# Patient Record
Sex: MALE | Race: ASIAN | NOT HISPANIC OR LATINO | Employment: FULL TIME | URBAN - METROPOLITAN AREA
[De-identification: names, ages, dates, MRNs, and addresses within clinical notes are randomized per-mention and may not be internally consistent; named-entity substitution may affect disease eponyms.]

---

## 2021-04-19 ENCOUNTER — OFFICE VISIT (OUTPATIENT)
Dept: FAMILY MEDICINE CLINIC | Facility: CLINIC | Age: 56
End: 2021-04-19
Payer: COMMERCIAL

## 2021-04-19 VITALS
SYSTOLIC BLOOD PRESSURE: 122 MMHG | DIASTOLIC BLOOD PRESSURE: 76 MMHG | HEART RATE: 65 BPM | BODY MASS INDEX: 34.96 KG/M2 | HEIGHT: 62 IN | TEMPERATURE: 98 F | WEIGHT: 190 LBS | RESPIRATION RATE: 18 BRPM | OXYGEN SATURATION: 99 %

## 2021-04-19 DIAGNOSIS — Z13.6 SCREENING FOR CARDIOVASCULAR CONDITION: ICD-10-CM

## 2021-04-19 DIAGNOSIS — M17.0 OSTEOARTHRITIS OF BOTH KNEES, UNSPECIFIED OSTEOARTHRITIS TYPE: ICD-10-CM

## 2021-04-19 DIAGNOSIS — Z12.5 PROSTATE CANCER SCREENING: ICD-10-CM

## 2021-04-19 DIAGNOSIS — D50.9 IRON DEFICIENCY ANEMIA, UNSPECIFIED IRON DEFICIENCY ANEMIA TYPE: ICD-10-CM

## 2021-04-19 DIAGNOSIS — Z00.00 WELL ADULT EXAM: Primary | ICD-10-CM

## 2021-04-19 PROBLEM — M17.10 OSTEOARTHRITIS OF KNEE: Status: ACTIVE | Noted: 2018-04-11

## 2021-04-19 PROBLEM — M17.11 PRIMARY OSTEOARTHRITIS OF RIGHT KNEE: Status: ACTIVE | Noted: 2018-04-11

## 2021-04-19 PROBLEM — E78.5 DYSLIPIDEMIA, GOAL LDL BELOW 130: Status: ACTIVE | Noted: 2019-04-26

## 2021-04-19 PROBLEM — M17.9 OSTEOARTHRITIS OF KNEE: Status: ACTIVE | Noted: 2018-04-11

## 2021-04-19 PROBLEM — L94.0: Status: ACTIVE | Noted: 2018-04-11

## 2021-04-19 PROCEDURE — 3725F SCREEN DEPRESSION PERFORMED: CPT | Performed by: INTERNAL MEDICINE

## 2021-04-19 PROCEDURE — 1036F TOBACCO NON-USER: CPT | Performed by: INTERNAL MEDICINE

## 2021-04-19 PROCEDURE — 99386 PREV VISIT NEW AGE 40-64: CPT | Performed by: INTERNAL MEDICINE

## 2021-04-19 PROCEDURE — 3008F BODY MASS INDEX DOCD: CPT | Performed by: INTERNAL MEDICINE

## 2021-04-19 RX ORDER — DICLOFENAC SODIUM 75 MG/1
75 TABLET, DELAYED RELEASE ORAL 2 TIMES DAILY PRN
COMMUNITY
Start: 2021-03-01 | End: 2021-05-08 | Stop reason: SDUPTHER

## 2021-04-19 NOTE — PROGRESS NOTES
FAMILY PRACTICE HEALTH MAINTENANCE OFFICE VISIT  [facility]    NAME: Faustino Gallagher  AGE: 50 y o  SEX: male  : 1965     DATE: 2021    Assessment and Plan     There are no diagnoses linked to this encounter  · Patient Counseling:   · Nutrition: Stressed importance of a well balanced diet, moderation of sodium/saturated fat, caloric balance and sufficient intake of fiber  · Exercise: Stressed the importance of regular exercise with a goal of 150 minutes per week  · Dental Health: Discussed daily flossing and brushing and regular dental visits     · Immunizations reviewed: Up To Date  · Discussed benefits of:  Colon Cancer Screening, Prostate Cancer Screening  and Screening labs   BMI Counseling: There is no height or weight on file to calculate BMI  Discussed with patient's BMI with him  The BMI is above normal  Nutrition recommendations include reducing portion sizes and decreasing overall calorie intake  Exercise recommendations include moderate aerobic physical activity for 150 minutes/week  No follow-ups on file  Chief Complaint     Chief Complaint   Patient presents with    Establish Care     Needs PCP  buck    Physical Exam       History of Present Illness     NP, here for CPE  Has history of iron deficiency anemia  Had colonoscopy , this was normal   Takes replacement as needed  No labs in a couple of years  Has history of bilateral knee DJD and has not seen ortho but does not want to  Has previously followed up with rheumatology and feels more comfortable doing this    Would like a referral          Well Adult Physical   Patient here for a comprehensive physical exam       Diet and Physical Activity  Diet: well balanced diet  Exercise: daily      Depression Screen  PHQ-9 Depression Screening    PHQ-9:   Frequency of the following problems over the past two weeks:      Little interest or pleasure in doing things: 0 - not at all  Feeling down, depressed, or hopeless: 0 - not at all  PHQ-2 Score: 0          General Health  Hearing: Normal:  bilateral  Vision: no vision problems  Dental: regular dental visits    Reproductive Health  No issues  and Denies nocturia      The following portions of the patient's history were reviewed and updated as appropriate: allergies, current medications, past family history, past medical history, past social history, past surgical history and problem list     Review of Systems     Review of Systems   Constitutional: Negative  HENT: Negative  Eyes: Negative  Respiratory: Negative  Cardiovascular: Negative  Gastrointestinal: Negative  Endocrine: Negative  Genitourinary: Negative  Musculoskeletal: Positive for arthralgias  Skin: Negative  Allergic/Immunologic: Negative  Neurological: Negative  Hematological: Negative  Psychiatric/Behavioral: Negative  Past Medical History     No past medical history on file  Past Surgical History     No past surgical history on file      Social History     Social History     Socioeconomic History    Marital status: /Civil Union     Spouse name: Not on file    Number of children: Not on file    Years of education: Not on file    Highest education level: Not on file   Occupational History    Not on file   Social Needs    Financial resource strain: Not on file    Food insecurity     Worry: Not on file     Inability: Not on file   [employer] needs     Medical: Not on file     Non-medical: Not on file   Tobacco Use    Smoking status: Not on file   Substance and Sexual Activity    Alcohol use: Not on file    Drug use: Not on file    Sexual activity: Not on file   Lifestyle    Physical activity     Days per week: Not on file     Minutes per session: Not on file    Stress: Not on file   Relationships    Social connections     Talks on phone: Not on file     Gets together: Not on file     Attends Muslim service: Not on file     Active member of club or organization: Not on file     Attends meetings of clubs or organizations: Not on file     Relationship status: Not on file    Intimate partner violence     Fear of current or ex partner: Not on file     Emotionally abused: Not on file     Physically abused: Not on file     Forced sexual activity: Not on file   Other Topics Concern    Not on file   Social History Narrative    Not on file       Family History     No family history on file  Current Medications       Current Outpatient Medications:     diclofenac (VOLTAREN) 75 mg EC tablet, Take 75 mg by mouth 2 (two) times a day as needed, Disp: , Rfl:      Allergies     No Known Allergies    Objective     There were no vitals taken for this visit  Physical Exam  Constitutional:       General: He is not in acute distress  Appearance: He is well-developed  HENT:      Head: Normocephalic and atraumatic  Right Ear: External ear normal       Left Ear: External ear normal       Nose: Nose normal    Eyes:      Conjunctiva/sclera: Conjunctivae normal       Pupils: Pupils are equal, round, and reactive to light  Neck:      Musculoskeletal: Normal range of motion and neck supple  Thyroid: No thyromegaly  Cardiovascular:      Rate and Rhythm: Normal rate and regular rhythm  Heart sounds: No murmur  No friction rub  No gallop  Pulmonary:      Effort: Pulmonary effort is normal       Breath sounds: Normal breath sounds  No wheezing or rales  Abdominal:      General: Bowel sounds are normal  There is no distension  Palpations: Abdomen is soft  Tenderness: There is no abdominal tenderness  Genitourinary:     Comments: Patient defers exam    Musculoskeletal: Normal range of motion  General: No tenderness or deformity  Lymphadenopathy:      Cervical: No cervical adenopathy  Skin:     General: Skin is dry  Findings: No rash     Neurological:      Mental Status: He is alert and oriented to person, place, and time  Cranial Nerves: No cranial nerve deficit  Motor: No abnormal muscle tone  Coordination: Coordination normal       Deep Tendon Reflexes: Reflexes are normal and symmetric  Reflexes normal    Psychiatric:         Behavior: Behavior normal          Thought Content:  Thought content normal          Judgment: Judgment normal             Visual Acuity Screening    Right eye Left eye Both eyes   Without correction: 20/25 20/25 20/25   With correction:              MD DOMINIQUE Penny [facility]

## 2021-04-20 ENCOUNTER — TELEPHONE (OUTPATIENT)
Dept: ADMINISTRATIVE | Facility: OTHER | Age: 56
End: 2021-04-20

## 2021-04-23 ENCOUNTER — IMMUNIZATIONS (OUTPATIENT)
Dept: FAMILY MEDICINE CLINIC | Facility: HOSPITAL | Age: 56
End: 2021-04-23

## 2021-04-23 DIAGNOSIS — Z23 ENCOUNTER FOR IMMUNIZATION: Primary | ICD-10-CM

## 2021-04-23 PROCEDURE — 0011A SARS-COV-2 / COVID-19 MRNA VACCINE (MODERNA) 100 MCG: CPT

## 2021-04-23 PROCEDURE — 91301 SARS-COV-2 / COVID-19 MRNA VACCINE (MODERNA) 100 MCG: CPT

## 2021-05-08 DIAGNOSIS — M17.0 OSTEOARTHRITIS OF BOTH KNEES, UNSPECIFIED OSTEOARTHRITIS TYPE: Primary | ICD-10-CM

## 2021-05-08 NOTE — TELEPHONE ENCOUNTER
----- Message from Yvrose Ortega sent at 5/8/2021  9:18 AM EDT -----  Regarding: Prescription Question  Contact: [phone]  Dear Dr Divina Mendosa,  I need prescription refill of diclofenac 75 mg    Thank you  Hoa Dejesus

## 2021-05-10 RX ORDER — DICLOFENAC SODIUM 75 MG/1
75 TABLET, DELAYED RELEASE ORAL 2 TIMES DAILY PRN
Qty: 60 TABLET | Refills: 1 | Status: SHIPPED | OUTPATIENT
Start: 2021-05-10 | End: 2021-06-12 | Stop reason: SDUPTHER

## 2021-05-25 ENCOUNTER — IMMUNIZATIONS (OUTPATIENT)
Dept: FAMILY MEDICINE CLINIC | Facility: HOSPITAL | Age: 56
End: 2021-05-25

## 2021-05-25 DIAGNOSIS — Z23 ENCOUNTER FOR IMMUNIZATION: Primary | ICD-10-CM

## 2021-05-25 PROCEDURE — 91301 SARS-COV-2 / COVID-19 MRNA VACCINE (MODERNA) 100 MCG: CPT

## 2021-05-25 PROCEDURE — 0012A SARS-COV-2 / COVID-19 MRNA VACCINE (MODERNA) 100 MCG: CPT

## 2021-06-06 LAB
ALBUMIN SERPL-MCNC: 4.2 G/DL (ref 3.8–4.9)
ALBUMIN/GLOB SERPL: 1.8 {RATIO} (ref 1.2–2.2)
ALP SERPL-CCNC: 113 IU/L (ref 48–121)
ALT SERPL-CCNC: 8 IU/L (ref 0–44)
AST SERPL-CCNC: 15 IU/L (ref 0–40)
BASOPHILS # BLD AUTO: 0.1 X10E3/UL (ref 0–0.2)
BASOPHILS NFR BLD AUTO: 1 %
BILIRUB SERPL-MCNC: 0.4 MG/DL (ref 0–1.2)
BUN SERPL-MCNC: 9 MG/DL (ref 6–24)
BUN/CREAT SERPL: 10 (ref 9–20)
CALCIUM SERPL-MCNC: 9.6 MG/DL (ref 8.7–10.2)
CHLORIDE SERPL-SCNC: 106 MMOL/L (ref 96–106)
CHOLEST SERPL-MCNC: 242 MG/DL (ref 100–199)
CHOLEST/HDLC SERPL: 6.9 RATIO (ref 0–5)
CO2 SERPL-SCNC: 23 MMOL/L (ref 20–29)
CREAT SERPL-MCNC: 0.93 MG/DL (ref 0.76–1.27)
EOSINOPHIL # BLD AUTO: 2 X10E3/UL (ref 0–0.4)
EOSINOPHIL NFR BLD AUTO: 24 %
ERYTHROCYTE [DISTWIDTH] IN BLOOD BY AUTOMATED COUNT: 12.8 % (ref 11.6–15.4)
GLOBULIN SER-MCNC: 2.4 G/DL (ref 1.5–4.5)
GLUCOSE SERPL-MCNC: 89 MG/DL (ref 65–99)
HCT VFR BLD AUTO: 41 % (ref 37.5–51)
HDLC SERPL-MCNC: 35 MG/DL
HGB BLD-MCNC: 13.5 G/DL (ref 13–17.7)
IMM GRANULOCYTES # BLD: 0 X10E3/UL (ref 0–0.1)
IMM GRANULOCYTES NFR BLD: 0 %
IRON SERPL-MCNC: 60 UG/DL (ref 38–169)
LDLC SERPL CALC-MCNC: 180 MG/DL (ref 0–99)
LYMPHOCYTES # BLD AUTO: 2.4 X10E3/UL (ref 0.7–3.1)
LYMPHOCYTES NFR BLD AUTO: 29 %
MCH RBC QN AUTO: 27.2 PG (ref 26.6–33)
MCHC RBC AUTO-ENTMCNC: 32.9 G/DL (ref 31.5–35.7)
MCV RBC AUTO: 83 FL (ref 79–97)
MICRODELETION SYND BLD/T FISH: NORMAL
MONOCYTES # BLD AUTO: 0.6 X10E3/UL (ref 0.1–0.9)
MONOCYTES NFR BLD AUTO: 7 %
MORPHOLOGY BLD-IMP: ABNORMAL
NEUTROPHILS # BLD AUTO: 3.4 X10E3/UL (ref 1.4–7)
NEUTROPHILS NFR BLD AUTO: 39 %
PLATELET # BLD AUTO: 348 X10E3/UL (ref 150–450)
POTASSIUM SERPL-SCNC: 4.4 MMOL/L (ref 3.5–5.2)
PROT SERPL-MCNC: 6.6 G/DL (ref 6–8.5)
PSA SERPL-MCNC: 0.3 NG/ML (ref 0–4)
RBC # BLD AUTO: 4.97 X10E6/UL (ref 4.14–5.8)
SL AMB EGFR AFRICAN AMERICAN: 108 ML/MIN/1.73
SL AMB EGFR NON AFRICAN AMERICAN: 93 ML/MIN/1.73
SL AMB VLDL CHOLESTEROL CALC: 27 MG/DL (ref 5–40)
SODIUM SERPL-SCNC: 141 MMOL/L (ref 134–144)
TRIGL SERPL-MCNC: 148 MG/DL (ref 0–149)
WBC # BLD AUTO: 8.4 X10E3/UL (ref 3.4–10.8)

## 2021-06-12 DIAGNOSIS — M17.0 OSTEOARTHRITIS OF BOTH KNEES, UNSPECIFIED OSTEOARTHRITIS TYPE: ICD-10-CM

## 2021-06-12 RX ORDER — DICLOFENAC SODIUM 75 MG/1
75 TABLET, DELAYED RELEASE ORAL 2 TIMES DAILY PRN
Qty: 60 TABLET | Refills: 0 | Status: SHIPPED | OUTPATIENT
Start: 2021-06-12 | End: 2021-07-04 | Stop reason: SDUPTHER

## 2021-07-04 DIAGNOSIS — M17.0 OSTEOARTHRITIS OF BOTH KNEES, UNSPECIFIED OSTEOARTHRITIS TYPE: ICD-10-CM

## 2021-07-06 RX ORDER — DICLOFENAC SODIUM 75 MG/1
75 TABLET, DELAYED RELEASE ORAL 2 TIMES DAILY PRN
Qty: 60 TABLET | Refills: 0 | Status: SHIPPED | OUTPATIENT
Start: 2021-07-06 | End: 2021-07-12

## 2021-07-10 ENCOUNTER — OFFICE VISIT (OUTPATIENT)
Dept: URGENT CARE | Facility: CLINIC | Age: 56
End: 2021-07-10
Payer: COMMERCIAL

## 2021-07-10 VITALS
HEART RATE: 81 BPM | HEIGHT: 63 IN | BODY MASS INDEX: 32.96 KG/M2 | DIASTOLIC BLOOD PRESSURE: 89 MMHG | SYSTOLIC BLOOD PRESSURE: 122 MMHG | TEMPERATURE: 98.8 F | OXYGEN SATURATION: 99 % | WEIGHT: 186 LBS | RESPIRATION RATE: 18 BRPM

## 2021-07-10 DIAGNOSIS — L25.9 CONTACT DERMATITIS, UNSPECIFIED CONTACT DERMATITIS TYPE, UNSPECIFIED TRIGGER: Primary | ICD-10-CM

## 2021-07-10 PROCEDURE — 3008F BODY MASS INDEX DOCD: CPT | Performed by: INTERNAL MEDICINE

## 2021-07-10 PROCEDURE — 99213 OFFICE O/P EST LOW 20 MIN: CPT | Performed by: PHYSICIAN ASSISTANT

## 2021-07-10 RX ORDER — METHYLPREDNISOLONE 4 MG/1
TABLET ORAL
Qty: 1 EACH | Refills: 0 | Status: SHIPPED | OUTPATIENT
Start: 2021-07-10 | End: 2021-09-03

## 2021-07-10 RX ORDER — TRIAMCINOLONE ACETONIDE 1 MG/G
CREAM TOPICAL 2 TIMES DAILY
Qty: 30 G | Refills: 0 | Status: SHIPPED | OUTPATIENT
Start: 2021-07-10

## 2021-07-10 NOTE — PATIENT INSTRUCTIONS
Acute Rash   WHAT YOU NEED TO KNOW:   A rash is irritated, red, or itchy skin or mucus membranes, such as the lining of your nose or throat  Acute means the rash starts suddenly, worsens quickly, and lasts a short time  Common causes include a disease or infection, a reaction to something you are allergic to, or certain medicines  DISCHARGE INSTRUCTIONS:   Return to the emergency department if:   · You have sudden trouble breathing or chest pain  · You are vomiting, have a headache or muscle aches, and your throat hurts  Call your doctor or dermatologist if:   · You have a fever  · You get open wounds from scratching your skin, or you have a wound that is red, swollen, or painful  · Your rash lasts longer than 3 months  · You have swelling or pain in your joints  · You have questions or concerns about your condition or care  Medicines:  If your rash does not go away on its own, you may need the following medicines:  · Antihistamines  may be given to help decrease itching  · Steroids  may be given to decrease inflammation  · Antibiotics  help fight or prevent a bacterial infection  · Take your medicine as directed  Contact your healthcare provider if you think your medicine is not helping or if you have side effects  Tell him of her if you are allergic to any medicine  Keep a list of the medicines, vitamins, and herbs you take  Include the amounts, and when and why you take them  Bring the list or the pill bottles to follow-up visits  Carry your medicine list with you in case of an emergency  Prevent a rash or care for your skin when you have a rash:  Dry skin can lead to more problems  Do not scratch your skin if it itches  You may cause a skin infection by scratching  The following may prevent dry skin, and help your skin look better:  · Help soothe your rash  Apply thick cream lotions or petroleum jelly  Cool compresses may also soothe your skin   Apply a cool compress or a cool, wet towel, and then cover it with a dry towel  · Use lukewarm water when you bathe  Hot water may damage your skin more  Pat your skin dry  Do not rub your skin with a towel  · Use detergents, soaps, shampoos, and bubble baths  made for sensitive skin  · Wear clothes made of cotton instead of nylon or wool  Cotton is softer, so it will not hurt your skin as much  Follow up with your healthcare providers as directed:  A dermatologist may help find the cause of your rash or help plan or change treatment  A dietitian may help with meal planning if you have a food allergy  Write down your questions so you remember to ask them during your visits  © Copyright [address] Information is for End User's use only and may not be sold, redistributed or otherwise used for commercial purposes  All illustrations and images included in CareNotes® are the copyrighted property of A D A M , Inc  or [facility]  The above information is an  only  It is not intended as medical advice for individual conditions or treatments  Talk to your doctor, nurse or pharmacist before following any medical regimen to see if it is safe and effective for you  Dermatitis:  -The rash does not appear infective  No sign of dermatophyte /yeast infection  No sign of lyme  No sign of poison ivy  We discussed that the rash appears to be a contact dermatitis     -Will prescribe Medrol dose aida to be started today and taken as directed  Take with meals    -Will also prescribe Triamcinolone cream to be applied as directed  -Stay well hydrated  You can take benadryl as directed for the itching    -topical benadryl can be applied for relief   You may also use calamine lotion or take oatmeal baths    -If your symptoms worsen or persist follow up immediately with your PCP or Dermatologist

## 2021-07-10 NOTE — PROGRESS NOTES
Assessment and Plan:   Mr Felicity Fagan is a 71-year-old male with history significant for bilateral knee osteoarthritis (presumed to be primary in nature), who presents for further evaluation of this  He is referred by Dr Kate Evans for a rheumatology consult  Daliacallie Xiao presents today for further evaluation of bilateral knee arthritic complaints he has been experiencing over the past 15 years, with symptoms potentially concerning for an inflammatory arthritis as he has noticed recurrent episodes of swelling and describes prolonged morning stiffness  There are also concerns for right knee synovitis noted on his examination today which I suspect is masked to some degree as he is on a current course of steroids that was prescribed for a skin rash  He reports that the steroids have significantly helped with the bilateral knee pain and swelling which may also guide towards an inflammatory arthritis  Based on his presentation I suspect that there may be a combination of both an inflammatory arthropathy and primary osteoarthritis ongoing     - To further evaluate his symptoms we will complete the workup as listed below and also obtain bilateral knee x-rays  Depending on the findings I may consider obtaining an MRI of his right knee which may aid in diagnosing an inflammatory arthritis  A repeat arthrocentesis may also be warranted but I would like to hold off on this for today as he has an overlying skin rash on his knees of unclear etiology  Intra-articular cortisone injections may also be beneficial but we will discuss this at future office visits  - In the meanwhile he will complete the steroid course and I will discontinue his diclofenac as this has been ineffective  I will trial him on meloxicam 15 mg once a day as needed until the follow up visit      - For further evaluation of the fevers he has been experiencing over the past 2-3 months I requested he contact his primary care physician for a fever work up       Plan:  Diagnoses and all orders for this visit:    Osteoarthritis of both knees, unspecified osteoarthritis type  -     ZULEMA Screen w/ Reflex to Titer/Pattern; Future  -     Sjogren's Antibodies; Future  -     RF Screen w/ Reflex to Titer; Future  -     Cyclic citrul peptide antibody, IgG; Future  -     C-reactive protein; Future  -     Sedimentation rate, automated; Future  -     HLA-B27 antigen; Future  -     Chronic Hepatitis Panel; Future  -     CK; Future  -     Ferritin; Future  -     Uric acid; Future  -     XR knee 3 vw left non injury; Future  -     XR knee 3 vw right non injury; Future  -     Ambulatory referral to Rheumatology  -     meloxicam (MOBIC) 15 mg tablet; Take 1 tablet (15 mg total) by mouth daily as needed for moderate pain    Fever, unspecified fever cause      Activities as tolerated  Exercise: try to maintain a low impact exercise regimen as much as possible  Continue other medications as prescribed by PCP and other specialists  RTC in 4 weeks  HPI  Mr South Lyles is a 61-year-old male with history significant for bilateral knee osteoarthritis (presumed to be primary in nature), who presents for further evaluation of this  He is referred by Dr Navarro Fuentes for a rheumatology consult  Patient reports over the past 15 years he has experienced recurrent pain and swelling affecting his bilateral knees, primarily on the right side  He was seen by Rheumatology in [facility] for these symptoms a few years ago and had a thorough rheumatological workup done which was unrevealing  His presentation was thought to be consistent with primary osteoarthritis  He has received intra-articular cortisone injections 1-2 times and this has helped him significantly  No recent injections  He has also had multiple arthrocentesis performed which apparently have been unrevealing    His most recent one was done in April 2019 and showed a synovial fluid WBC count of 2464 with a negative synovial fluid crystal and Lyme PCR analysis  There was a note mentioning extensive inflammation and reactive changes were present  He has not had knee x-rays done in a few years but apparently this has showed tricompartmental narrowing with joint effusions  He has not had an MRI of his knees  He reports over the past 15 years his symptoms have progressively worsened where he experiences some degree of pain and swelling on a daily and constant basis, but states that the swelling can flare-up  He reports over the past 15 years he has also had intermittent pain that will affect his bilateral hands (in the PIP and MCP joints), wrists, ankles and feet  These episodes are usually short-lived and infrequent  He denies any joint pains in his elbows, shoulders, low back or hips  Other than the knee swelling he denies other joint swelling  He experiences morning stiffness which affects his knees and can take 2-3 hours to improve  He has tried naproxen in the past which has provided him with mild relief which is short-lived  He has been on diclofenac 75 mg twice a day as needed and states that this does not really help  He has not been prescribed repeat courses of steroids in the past   He is currently on a methylprednisolone Dosepak that was started on July 10th after he was seen in the urgent care for a skin rash that he has been experiencing on his knees for the past 5 days  He reports with his current steroid use this has significantly helped with the bilateral knee pain and swelling and he feels like the skin rash is also clearing up  He reports that the skin rash has occurred for the first time and is not a recurrent issue  He mentions over the past 2-3 months he has been experiencing fevers up to 102° F which will last 24 hours and occurs every 7-10 days  He has not seen his primary care physician for this    He denies night sweats, unintentional weight loss, inflammatory eye disease (he does experience chronic dry eyes and occasional dry mouth), other types of skin rash, psoriasis, mouth/nose ulcers (can occasionally experience a canker sore related to increased stress), swollen glands, pleuritic chest pain, inflammatory bowel disease, blood clots or a family history of autoimmune disease  He denies any other medical conditions or symptoms and reports that he is otherwise healthy  The following portions of the patient's history were reviewed and updated as appropriate: allergies, current medications, past family history, past medical history, past social history, past surgical history and problem list       Review of Systems  Constitutional: Negative for weight change, chills, night sweats, fatigue  Positive for fever  ENT/Mouth: Negative for hearing changes, ear pain, nasal congestion, sinus pain, hoarseness, sore throat, rhinorrhea, swallowing difficulty  Eyes: Negative for pain, redness, discharge, vision changes  Cardiovascular: Negative for chest pain, SOB, palpitations  Respiratory: Negative for cough, sputum, wheezing, dyspnea  Gastrointestinal: Negative for nausea, vomiting, diarrhea, constipation, pain, heartburn  Genitourinary: Negative for dysuria, urinary frequency, hematuria  Musculoskeletal: As per HPI  Skin: Negative for color changes  Positive for skin rash  Neuro: Negative for weakness, numbness, tingling, loss of consciousness  Psych: Negative for anxiety, depression  Heme/Lymph: Negative for easy bruising, bleeding, lymphadenopathy  History reviewed  No pertinent past medical history        Past Surgical History:   Procedure Laterality Date    APPENDECTOMY      DENTAL SURGERY         Social History     Socioeconomic History    Marital status: /Civil Union     Spouse name: Not on file    Number of children: Not on file    Years of education: Not on file    Highest education level: Not on file   Occupational History    Not on file   Tobacco Use    Smoking status: Never Smoker    Smokeless tobacco: Never Used   Vaping Use    Vaping Use: Never used   Substance and Sexual Activity    Alcohol use: Never    Drug use: Never    Sexual activity: Not on file   Other Topics Concern    Not on file   Social History Narrative    Not on file     Social Determinants of Health     Financial Resource Strain:     Difficulty of Paying Living Expenses:    Food Insecurity:     Worried About Running Out of Food in the Last Year:     [address] in the Last Year:    Transportation Needs:     Lack of Transportation (Medical):  Lack of Transportation (Non-Medical):    Physical Activity:     Days of Exercise per Week:     Minutes of Exercise per Session:    Stress:     Feeling of Stress :    Social Connections:     Frequency of Communication with Friends and Family:     Frequency of Social Gatherings with Friends and Family:     Attends [employer] Services:     Active Member of Clubs or Organizations:     Attends Club or Organization Meetings:     Marital Status:    Intimate Partner Violence:     Fear of Current or Ex-Partner:     Emotionally Abused:     Physically Abused:     Sexually Abused:        History reviewed  No pertinent family history  No Known Allergies      Current Outpatient Medications:     meloxicam (MOBIC) 15 mg tablet, Take 1 tablet (15 mg total) by mouth daily as needed for moderate pain, Disp: 30 tablet, Rfl: 0    methylPREDNISolone 4 MG tablet therapy pack, Use as directed on package, Disp: 1 each, Rfl: 0    triamcinolone (KENALOG) 0 1 % cream, Apply topically 2 (two) times a day, Disp: 30 g, Rfl: 0      Objective:    Vitals:    07/12/21 0856   BP: 128/78   Pulse: 71   Temp: 98 6 °F (37 °C)   Weight: 85 1 kg (187 lb 9 6 oz)       Physical Exam  General: Well appearing, well nourished, in no distress  Oriented x 3, normal mood and affect  Ambulating without difficulty    Skin: Good turgor, no unusual bruising or prominent lesions  There is a macular pinpoint, erythematous rash scattered over his bilateral knees  Hair: Male pattern baldness  Nails: Normal color, no deformities  HEENT:  Head: Normocephalic, atraumatic  Eyes: Conjunctiva clear, sclera non-icteric, EOM intact  Extremities: No amputations or deformities, cyanosis, edema  Musculoskeletal:   Hands, wrists, elbows and shoulders - unremarkable  Ankles and feet - unremarkable  Knees - there is mild synovitis noted at his right knee without tenderness or restriction in range of motion  There is no warmth or erythema noted (except for related to the skin rash)  The left knee appears to be unremarkable without any significant soft tissue swelling or tenderness  Neurologic: Alert and oriented  No focal neurological deficits appreciated  Psychiatric: Normal mood and affect  MAXWELL Cox    Rheumatology

## 2021-07-10 NOTE — PROGRESS NOTES
[facility] [employer] [facility]        NAME: Davide Ferreira is a 50 y o  male  : 1965    MRN: [id number]  DATE: July 10, 2021  TIME: 8:43 AM    Assessment and Plan   Contact dermatitis, unspecified contact dermatitis type, unspecified trigger [L25 9]  1  Contact dermatitis, unspecified contact dermatitis type, unspecified trigger  methylPREDNISolone 4 MG tablet therapy pack    triamcinolone (KENALOG) 0 1 % cream         Patient Instructions   Dermatitis:  -The rash does not appear infective  No sign of dermatophyte /yeast infection  No sign of lyme  No sign of poison ivy  We discussed that the rash appears to be a contact dermatitis     -Will prescribe Medrol dose aida to be started today and taken as directed  Take with meals    -Will also prescribe Triamcinolone cream to be applied as directed  -Stay well hydrated  You can take benadryl as directed for the itching    -topical benadryl can be applied for relief  You may also use calamine lotion or take oatmeal baths    -If your symptoms worsen or persist follow up immediately with your PCP or Dermatologist     Follow up with PCP in 3-5 days  Proceed to  ER if symptoms worsen  Chief Complaint     Chief Complaint   Patient presents with    Rash     bilateral knee rash itching but no pain began 2 days ago no know exposure to plants or chemicals no new foods          History of Present Illness       The patient presents today for bilateral rash of the knees x 2 days  The patient states that the rash is pruritic but non-painful  The rash is over the patella of the knees bilaterally and is erythematous and raised  No new medications  No recent time spent outdoors  No new detergents or lotions  No recent travel or sick contacts  No fever or chills  No insect bite  No headache  No fatigue  No OTC measures attempted  He has never had a rash like this before  He notes a hx of osteoarthritis but no hx of psoriasis         Review of Systems   Review of Systems Constitutional: Negative for activity change, appetite change, chills, diaphoresis, fatigue and fever  HENT: Negative for facial swelling, sore throat and trouble swallowing  Respiratory: Negative for chest tightness, shortness of breath, wheezing and stridor  Cardiovascular: Negative for chest pain and palpitations  Musculoskeletal: Negative for arthralgias, joint swelling and myalgias  Skin: Positive for rash  Allergic/Immunologic: Negative for environmental allergies, food allergies and immunocompromised state  Neurological: Negative for dizziness and light-headedness  Hematological: Negative for adenopathy  Does not bruise/bleed easily  Current Medications       Current Outpatient Medications:     meloxicam (MOBIC) 15 mg tablet, Take 1 tablet (15 mg total) by mouth daily as needed for moderate pain, Disp: 30 tablet, Rfl: 0    methylPREDNISolone 4 MG tablet therapy pack, Use as directed on package, Disp: 1 each, Rfl: 0    triamcinolone (KENALOG) 0 1 % cream, Apply topically 2 (two) times a day, Disp: 30 g, Rfl: 0    Current Allergies     Allergies as of 07/10/2021    (No Known Allergies)            The following portions of the patient's history were reviewed and updated as appropriate: allergies, current medications, past family history, past medical history, past social history, past surgical history and problem list      No past medical history on file  Past Surgical History:   Procedure Laterality Date    APPENDECTOMY      DENTAL SURGERY         No family history on file  Medications have been verified  Objective   /89   Pulse 81   Temp 98 8 °F (37 1 °C)   Resp 18   Ht 5' 3" (1 6 m)   Wt 84 4 kg (186 lb)   SpO2 99%   BMI 32 95 kg/m²   No LMP for male patient  Physical Exam     Physical Exam  Vitals and nursing note reviewed  Constitutional:       General: He is not in acute distress  Appearance: He is well-developed   He is not diaphoretic  HENT:      Mouth/Throat:      Pharynx: Uvula midline  Cardiovascular:      Rate and Rhythm: Normal rate and regular rhythm  Pulses: Normal pulses  Heart sounds: Normal heart sounds, S1 normal and S2 normal  No murmur heard  Pulmonary:      Effort: Pulmonary effort is normal  No tachypnea, accessory muscle usage or respiratory distress  Breath sounds: Normal breath sounds  No stridor  No decreased breath sounds, wheezing, rhonchi or rales  Skin:     General: Skin is warm and dry  Capillary Refill: Capillary refill takes less than 2 seconds  Findings: Rash present  Rash is macular and papular  Comments: There is a maculopapular rash over the flexor and extensor surface of the knees bilaterally  The rash extends over the patella bilaterally  There is also rash over the popliteal fossa  The rash is erythematous  No vesicles  The skin is intact  No scaling  No central clearing

## 2021-07-12 ENCOUNTER — CONSULT (OUTPATIENT)
Dept: RHEUMATOLOGY | Facility: CLINIC | Age: 56
End: 2021-07-12
Payer: COMMERCIAL

## 2021-07-12 VITALS
SYSTOLIC BLOOD PRESSURE: 128 MMHG | TEMPERATURE: 98.6 F | DIASTOLIC BLOOD PRESSURE: 78 MMHG | WEIGHT: 187.6 LBS | BODY MASS INDEX: 33.23 KG/M2 | HEART RATE: 71 BPM

## 2021-07-12 DIAGNOSIS — M17.0 OSTEOARTHRITIS OF BOTH KNEES, UNSPECIFIED OSTEOARTHRITIS TYPE: Primary | ICD-10-CM

## 2021-07-12 DIAGNOSIS — R50.9 FEVER, UNSPECIFIED FEVER CAUSE: ICD-10-CM

## 2021-07-12 PROCEDURE — 99205 OFFICE O/P NEW HI 60 MIN: CPT | Performed by: INTERNAL MEDICINE

## 2021-07-12 PROCEDURE — 1036F TOBACCO NON-USER: CPT | Performed by: INTERNAL MEDICINE

## 2021-07-12 RX ORDER — MELOXICAM 15 MG/1
15 TABLET ORAL DAILY PRN
Qty: 30 TABLET | Refills: 0 | Status: SHIPPED | OUTPATIENT
Start: 2021-07-12 | End: 2021-08-05 | Stop reason: SDUPTHER

## 2021-07-14 ENCOUNTER — TELEPHONE (OUTPATIENT)
Dept: FAMILY MEDICINE CLINIC | Facility: CLINIC | Age: 56
End: 2021-07-14

## 2021-07-14 NOTE — TELEPHONE ENCOUNTER
----- Message from Tatiana Torres sent at 7/13/2021  8:17 AM EDT -----  Regarding: FW: Non-Urgent Medical Question  Contact: [phone]    ----- Message -----  From: Leonel Cates  Sent: 7/12/2021   8:21 PM EDT  To: THE [facility]  Subject: Non-Urgent Medical Question                      Dear Dr Liane Kwon,  Today I met with Dr Katie Burkitt, Rheumatologist  Please see the after visit summary notes from her -   "He mentions over the past 2-3 months he has been experiencing fevers up to 102° F which will last 24 hours and occurs every 7-10 days  He has not seen his primary care physician for this  He denies night sweats, unintentional weight loss, inflammatory eye disease (he does experience chronic dry eyes and occasional dry mouth), other types of skin rash, psoriasis, mouth/nose ulcers (can occasionally experience a canker sore related to increased stress), swollen glands, pleuritic chest pain, inflammatory bowel disease, blood clots or a family history of autoimmune disease  He denies any other medical conditions or symptoms and reports that he is otherwise healthy"  So far I have been thinking that fever is due to osteoarthritis  But Dr Mariaa Suarez says that it might be due to some other fevers and I need to contact you regarding this  Please inform whether I should have an appointment with you or based on your suggestions I can have blood work done again  Please inform    Sincerely,  Araceli Galvin

## 2021-07-14 NOTE — TELEPHONE ENCOUNTER
----- Message from VernoniaTatiana Freeman sent at 7/13/2021  8:17 AM EDT -----  Regarding: FW: Non-Urgent Medical Question  Contact: [phone]    ----- Message -----  From: Margret Alejandro  Sent: 7/12/2021   8:21 PM EDT  To: THE [facility]  Subject: Non-Urgent Medical Question                      Dear Dr Davide Graves,  Today I met with Dr Kalia Zavala, Rheumatologist  Please see the after visit summary notes from her -   "He mentions over the past 2-3 months he has been experiencing fevers up to 102° F which will last 24 hours and occurs every 7-10 days  He has not seen his primary care physician for this  He denies night sweats, unintentional weight loss, inflammatory eye disease (he does experience chronic dry eyes and occasional dry mouth), other types of skin rash, psoriasis, mouth/nose ulcers (can occasionally experience a canker sore related to increased stress), swollen glands, pleuritic chest pain, inflammatory bowel disease, blood clots or a family history of autoimmune disease  He denies any other medical conditions or symptoms and reports that he is otherwise healthy"  So far I have been thinking that fever is due to osteoarthritis  But Dr Chanel Heck says that it might be due to some other fevers and I need to contact you regarding this  Please inform whether I should have an appointment with you or based on your suggestions I can have blood work done again  Please inform    Sincerely,  Renard Mohs

## 2021-08-05 DIAGNOSIS — M17.0 OSTEOARTHRITIS OF BOTH KNEES, UNSPECIFIED OSTEOARTHRITIS TYPE: ICD-10-CM

## 2021-08-05 RX ORDER — MELOXICAM 15 MG/1
15 TABLET ORAL DAILY PRN
Qty: 30 TABLET | Refills: 0 | Status: SHIPPED | OUTPATIENT
Start: 2021-08-05 | End: 2021-09-03 | Stop reason: SDUPTHER

## 2021-08-06 LAB
ANA SPECKLED TITR SER: ABNORMAL {TITER}
ANA TITR SER IF: POSITIVE {TITER}
CCP IGA+IGG SERPL IA-ACNC: 7 UNITS (ref 0–19)
CK SERPL-CCNC: 36 U/L (ref 41–331)
CRP SERPL-MCNC: 15 MG/L (ref 0–10)
ENA SS-A AB SER-ACNC: <0.2 AI (ref 0–0.9)
ENA SS-B AB SER-ACNC: <0.2 AI (ref 0–0.9)
ERYTHROCYTE [SEDIMENTATION RATE] IN BLOOD BY WESTERGREN METHOD: 29 MM/HR (ref 0–30)
FERRITIN SERPL-MCNC: 37 NG/ML (ref 30–400)
HAV AB SER QL IA: POSITIVE
HBV CORE AB SERPL QL IA: NEGATIVE
HBV SURFACE AB SER QL: NON REACTIVE
HBV SURFACE AG SERPL QL IA: NEGATIVE
HCV AB S/CO SERPL IA: <0.1 S/CO RATIO (ref 0–0.9)
HLA-B27 QL NAA+PROBE: NEGATIVE
RHEUMATOID FACT SERPL-ACNC: <10 IU/ML (ref 0–13.9)
SL AMB COMMENTS: NORMAL
SL AMB NOTE:: ABNORMAL
URATE SERPL-MCNC: 8.2 MG/DL (ref 3.8–8.4)

## 2021-08-11 ENCOUNTER — HOSPITAL ENCOUNTER (OUTPATIENT)
Dept: RADIOLOGY | Facility: HOSPITAL | Age: 56
Discharge: HOME | End: 2021-08-11
Attending: INTERNAL MEDICINE
Payer: COMMERCIAL

## 2021-08-11 DIAGNOSIS — M17.0 OSTEOARTHRITIS OF BOTH KNEES, UNSPECIFIED OSTEOARTHRITIS TYPE: ICD-10-CM

## 2021-08-11 PROCEDURE — 73562 X-RAY EXAM OF KNEE 3: CPT

## 2021-09-02 NOTE — PROGRESS NOTES
Assessment and Plan:   Mr Kami Lynn is a 72-year-old male with history significant for bilateral knee osteoarthritis (presumed to be primary in nature), who presents for follow up of this        - Hector Keane presents today for follow up of bilateral knee arthritic complaints he has been experiencing over the past 15 years, with symptoms potentially concerning for an inflammatory arthritis as he has noticed recurrent episodes of swelling and describes prolonged morning stiffness  There were also concerns for right knee synovitis noted on his examination previously and he does respond to courses of steroids which may further support the diagnosis of an inflammatory arthritis  Overall I suspect we may be dealing with a combination of both an inflammatory arthropathy and primary osteoarthritis ongoing     - Serological evaluation for an inflammatory arthritis was unremarkable (except for a positive ZULEMA dense fine speckled pattern which has less of an association with underlying autoimmune diseases and he also does not report any symptoms that would be concerning for a lupus-like condition), so I would like to pursue an MRI of his right knee as this will help with accurately determining between osteoarthritis and an inflammatory process  For now he may continue with the meloxicam 15 mg once a day as needed and after I receive the results of his right knee MRI I will determine if he needs a sooner follow-up if a change in treatment is required    He is aware if the diagnosis is solely consistent with osteoarthritis (which I suspect to be less likely) then he will continue with the conservative measures        Plan:  Diagnoses and all orders for this visit:    Inflammatory arthritis  -     MRI knee right without contrast; Future    Chronic pain of right knee  -     MRI knee right without contrast; Future    Osteoarthritis of both knees, unspecified osteoarthritis type  -     MRI knee right without contrast; Future  - meloxicam (MOBIC) 15 mg tablet; Take 1 tablet (15 mg total) by mouth daily as needed for moderate pain    Positive ZULEMA (antinuclear antibody)  -     ZULEMA Comprehensive Panel; Future  -     C3 complement; Future  -     C4 complement; Future  -     Beta-2 glycoprotein antibodies; Future  -     Cardiolipin antibody; Future  -     Lupus anticoagulant; Future  -     Urinalysis with microscopic  -     Protein / creatinine ratio, urine    Other orders  -     Cancel: MRI knee right without contrast; Future      Activities as tolerated  Exercise: try to maintain a low impact exercise regimen as much as possible  Continue other medications as prescribed by PCP and other specialists  RTC in 6 months  HPI    INITIAL VISIT NOTE (7/2021):  Mr Felicity Fagan is a 61-year-old male with history significant for bilateral knee osteoarthritis (presumed to be primary in nature), who presents for further evaluation of this  He is referred by Dr Kate Evans for a rheumatology consult        Patient reports over the past 15 years he has experienced recurrent pain and swelling affecting his bilateral knees, primarily on the right side  He was seen by Rheumatology in [facility] for these symptoms a few years ago and had a thorough rheumatological workup done which was unrevealing  His presentation was thought to be consistent with primary osteoarthritis  He has received intra-articular cortisone injections 1-2 times and this has helped him significantly  No recent injections  He has also had multiple arthrocentesis performed which apparently have been unrevealing  His most recent one was done in April 2019 and showed a synovial fluid WBC count of 2464 with a negative synovial fluid crystal and Lyme PCR analysis  There was a note mentioning extensive inflammation and reactive changes were present  He has not had knee x-rays done in a few years but apparently this has showed tricompartmental narrowing with joint effusions    He has not had an MRI of his knees      He reports over the past 15 years his symptoms have progressively worsened where he experiences some degree of pain and swelling on a daily and constant basis, but states that the swelling can flare-up  He reports over the past 15 years he has also had intermittent pain that will affect his bilateral hands (in the PIP and MCP joints), wrists, ankles and feet  These episodes are usually short-lived and infrequent  He denies any joint pains in his elbows, shoulders, low back or hips  Other than the knee swelling he denies other joint swelling  He experiences morning stiffness which affects his knees and can take 2-3 hours to improve  He has tried naproxen in the past which has provided him with mild relief which is short-lived  He has been on diclofenac 75 mg twice a day as needed and states that this does not really help  He has not been prescribed repeat courses of steroids in the past   He is currently on a methylprednisolone Dosepak that was started on July 10th after he was seen in the urgent care for a skin rash that he has been experiencing on his knees for the past 5 days  He reports with his current steroid use this has significantly helped with the bilateral knee pain and swelling and he feels like the skin rash is also clearing up  He reports that the skin rash has occurred for the first time and is not a recurrent issue      He mentions over the past 2-3 months he has been experiencing fevers up to 102° F which will last 24 hours and occurs every 7-10 days  He has not seen his primary care physician for this    He denies night sweats, unintentional weight loss, inflammatory eye disease (he does experience chronic dry eyes and occasional dry mouth), other types of skin rash, psoriasis, mouth/nose ulcers (can occasionally experience a canker sore related to increased stress), swollen glands, pleuritic chest pain, inflammatory bowel disease, blood clots or a family history of autoimmune disease  He denies any other medical conditions or symptoms and reports that he is otherwise healthy  9/3/2021:  Patient presents for a follow up visit today  The labs showed a positive ZULEMA 1:160 dense fine speckled pattern  A C reactive protein was slightly elevated at 15  A ferritin, CK, ESR, uric acid, HLA B27 antigen, anti CCP antibody, rheumatoid factor, Sjogren's antibodies and hepatitis panel were unremarkable  X-rays of the bilateral knees showed osteoarthritis with small joint effusions  After the last office visit we discussed discontinuing the diclofenac as it was not helping with his symptoms  I replaced this with meloxicam 15 mg once a day as needed and he states that this has been helping him with his overall joint pains, but he still experiences pain in his knees as well as at the heels of his bilateral feet, which is more prominent first thing in the morning with the first few steps and then gradually improves  No other significant joint pains at this time  He still notices swelling of his knees, more prominently on the right side  He states that taking meloxicam in combination with the multivitamin seems to be helping him as well  Since starting the meloxicam he has not had any fevers  No other complaints today  The following portions of the patient's history were reviewed and updated as appropriate: allergies, current medications, past family history, past medical history, past social history, past surgical history and problem list       Review of Systems  Constitutional: Negative for weight change, fevers, chills, night sweats, fatigue  ENT/Mouth: Negative for hearing changes, ear pain, nasal congestion, sinus pain, hoarseness, sore throat, rhinorrhea, swallowing difficulty  Eyes: Negative for pain, redness, discharge, vision changes  Cardiovascular: Negative for chest pain, SOB, palpitations     Respiratory: Negative for cough, sputum, wheezing, dyspnea  Gastrointestinal: Negative for nausea, vomiting, diarrhea, constipation, pain, heartburn  Genitourinary: Negative for dysuria, urinary frequency, hematuria  Musculoskeletal: As per HPI  Skin: Negative for skin rash, color changes  Neuro: Negative for weakness, numbness, tingling, loss of consciousness  Psych: Negative for anxiety, depression  Heme/Lymph: Negative for easy bruising, bleeding, lymphadenopathy  History reviewed  No pertinent past medical history  Past Surgical History:   Procedure Laterality Date    APPENDECTOMY      DENTAL SURGERY         Social History     Socioeconomic History    Marital status: /Civil Union     Spouse name: Not on file    Number of children: Not on file    Years of education: Not on file    Highest education level: Not on file   Occupational History    Not on file   Tobacco Use    Smoking status: Never Smoker    Smokeless tobacco: Never Used   Vaping Use    Vaping Use: Never used   Substance and Sexual Activity    Alcohol use: Never    Drug use: Never    Sexual activity: Not on file   Other Topics Concern    Not on file   Social History Narrative    Not on file     Social Determinants of Health     Financial Resource Strain:     Difficulty of Paying Living Expenses:    Food Insecurity:     Worried About Running Out of Food in the Last Year:     [address] in the Last Year:    Transportation Needs:     Lack of Transportation (Medical):      Lack of Transportation (Non-Medical):    Physical Activity:     Days of Exercise per Week:     Minutes of Exercise per Session:    Stress:     Feeling of Stress :    Social Connections:     Frequency of Communication with Friends and Family:     Frequency of Social Gatherings with Friends and Family:     Attends Denominational Services:     Active Member of Clubs or Organizations:     Attends Club or Organization Meetings:     Marital Status:    Intimate Partner Violence:     Fear of Current or Ex-Partner:     Emotionally Abused:     Physically Abused:     Sexually Abused:        History reviewed  No pertinent family history  No Known Allergies      Current Outpatient Medications:     meloxicam (MOBIC) 15 mg tablet, Take 1 tablet (15 mg total) by mouth daily as needed for moderate pain, Disp: 90 tablet, Rfl: 1    triamcinolone (KENALOG) 0 1 % cream, Apply topically 2 (two) times a day, Disp: 30 g, Rfl: 0      Objective:    Vitals:    09/03/21 0844   BP: 136/78   BP Location: Left arm   Patient Position: Sitting   Cuff Size: Adult   Pulse: 85   Temp: 98 6 °F (37 °C)       Physical Exam  General: Well appearing, well nourished, in no distress  Oriented x 3, normal mood and affect  Ambulating without difficulty  Skin: Good turgor, no rash, unusual bruising or prominent lesions  Nails: Normal color, no deformities  HEENT:  Head: Normocephalic, atraumatic  Eyes: Conjunctiva clear, sclera non-icteric, EOM intact  Extremities: No amputations or deformities, cyanosis, edema  Musculoskeletal:   Bilateral knees - soft tissue swelling noted, more prominent on the right side  Neurologic: Alert and oriented  No focal neurological deficits appreciated  Psychiatric: Normal mood and affect  MAXWELL Morgan    Rheumatology

## 2021-09-03 ENCOUNTER — OFFICE VISIT (OUTPATIENT)
Dept: RHEUMATOLOGY | Facility: CLINIC | Age: 56
End: 2021-09-03
Payer: COMMERCIAL

## 2021-09-03 VITALS — TEMPERATURE: 98.6 F | SYSTOLIC BLOOD PRESSURE: 136 MMHG | HEART RATE: 85 BPM | DIASTOLIC BLOOD PRESSURE: 78 MMHG

## 2021-09-03 DIAGNOSIS — M25.561 CHRONIC PAIN OF RIGHT KNEE: ICD-10-CM

## 2021-09-03 DIAGNOSIS — R76.8 POSITIVE ANA (ANTINUCLEAR ANTIBODY): ICD-10-CM

## 2021-09-03 DIAGNOSIS — G89.29 CHRONIC PAIN OF RIGHT KNEE: ICD-10-CM

## 2021-09-03 DIAGNOSIS — M17.0 OSTEOARTHRITIS OF BOTH KNEES, UNSPECIFIED OSTEOARTHRITIS TYPE: ICD-10-CM

## 2021-09-03 DIAGNOSIS — M19.90 INFLAMMATORY ARTHRITIS: Primary | ICD-10-CM

## 2021-09-03 PROCEDURE — 99215 OFFICE O/P EST HI 40 MIN: CPT | Performed by: INTERNAL MEDICINE

## 2021-09-03 RX ORDER — MELOXICAM 15 MG/1
15 TABLET ORAL DAILY PRN
Qty: 90 TABLET | Refills: 1 | Status: SHIPPED | OUTPATIENT
Start: 2021-09-03 | End: 2022-03-04 | Stop reason: SDUPTHER

## 2021-10-02 ENCOUNTER — HOSPITAL ENCOUNTER (OUTPATIENT)
Dept: RADIOLOGY | Facility: HOSPITAL | Age: 56
Discharge: HOME | End: 2021-10-02
Attending: INTERNAL MEDICINE
Payer: COMMERCIAL

## 2021-10-02 DIAGNOSIS — M19.90 INFLAMMATORY ARTHRITIS: ICD-10-CM

## 2021-10-02 DIAGNOSIS — G89.29 CHRONIC PAIN OF RIGHT KNEE: ICD-10-CM

## 2021-10-02 DIAGNOSIS — M17.0 OSTEOARTHRITIS OF BOTH KNEES, UNSPECIFIED OSTEOARTHRITIS TYPE: ICD-10-CM

## 2021-10-02 DIAGNOSIS — M25.561 CHRONIC PAIN OF RIGHT KNEE: ICD-10-CM

## 2021-10-02 PROCEDURE — 73721 MRI JNT OF LWR EXTRE W/O DYE: CPT

## 2021-10-02 PROCEDURE — G1004 CDSM NDSC: HCPCS

## 2022-01-14 ENCOUNTER — IMMUNIZATIONS (OUTPATIENT)
Dept: FAMILY MEDICINE CLINIC | Facility: HOSPITAL | Age: 57
End: 2022-01-14

## 2022-01-14 DIAGNOSIS — Z23 ENCOUNTER FOR IMMUNIZATION: Primary | ICD-10-CM

## 2022-01-14 PROCEDURE — 91306 COVID-19 MODERNA VACC 0.25 ML BOOSTER: CPT

## 2022-01-14 PROCEDURE — 0064A COVID-19 MODERNA VACC 0.25 ML BOOSTER: CPT

## 2022-03-04 ENCOUNTER — TELEMEDICINE (OUTPATIENT)
Dept: RHEUMATOLOGY | Facility: CLINIC | Age: 57
End: 2022-03-04
Payer: COMMERCIAL

## 2022-03-04 ENCOUNTER — TELEPHONE (OUTPATIENT)
Dept: OBGYN CLINIC | Facility: CLINIC | Age: 57
End: 2022-03-04

## 2022-03-04 DIAGNOSIS — M17.0 OSTEOARTHRITIS OF BOTH KNEES, UNSPECIFIED OSTEOARTHRITIS TYPE: Primary | ICD-10-CM

## 2022-03-04 DIAGNOSIS — Z79.1 NSAID LONG-TERM USE: ICD-10-CM

## 2022-03-04 DIAGNOSIS — R76.8 POSITIVE ANA (ANTINUCLEAR ANTIBODY): ICD-10-CM

## 2022-03-04 PROCEDURE — 99214 OFFICE O/P EST MOD 30 MIN: CPT | Performed by: INTERNAL MEDICINE

## 2022-03-04 PROCEDURE — 1036F TOBACCO NON-USER: CPT | Performed by: INTERNAL MEDICINE

## 2022-03-04 RX ORDER — MELOXICAM 15 MG/1
15 TABLET ORAL DAILY PRN
Qty: 90 TABLET | Refills: 3 | Status: SHIPPED | OUTPATIENT
Start: 2022-03-04

## 2022-03-04 NOTE — PROGRESS NOTES
Virtual Regular Visit    Verification of patient location:    Patient is located in the following state in which I hold an active license [place]      Assessment/Plan:    Problem List Items Addressed This Visit        Musculoskeletal and Integument    Osteoarthritis of knee - Primary    Relevant Medications    meloxicam (MOBIC) 15 mg tablet      Other Visit Diagnoses     NSAID long-term use        Relevant Orders    Comprehensive metabolic panel    CBC and differential    Positive ZULEMA (antinuclear antibody)        Relevant Orders    ZULEMA Comprehensive Panel    C3 complement    C4 complement    Beta-2 glycoprotein antibodies    Cardiolipin antibody    Urinalysis with microscopic    Protein / creatinine ratio, urine    Lupus anticoagulant               Reason for visit is follow up  Chief Complaint   Patient presents with    Virtual Regular Visit        Encounter provider Elmira Fofana MD    Provider located at [address] [place] [id number] [phone]      Recent Visits  No visits were found meeting these conditions  Showing recent visits within past 7 days and meeting all other requirements  Today's Visits  Date Type Provider Dept   03/04/22 Telemedicine Elmira Fofana MD [facility] today's visits and meeting all other requirements  Future Appointments  No visits were found meeting these conditions  Showing future appointments within next 150 days and meeting all other requirements       The patient was identified by name and date of birth  [place] Favor was informed that this is a telemedicine visit and that the visit is being conducted through Telephone  My office door was closed  No one else was in the room  He acknowledged consent and understanding of privacy and security of the video platform   The patient has agreed to participate and understands they can discontinue the visit at any time  Patient is aware this is a billable service         Subjective    HPI     INITIAL VISIT NOTE (7/2021):  Mr Elo Saenz a 46-year-old male with history significant for bilateral knee osteoarthritis (presumed to be primary in nature), who presents for further evaluation of this  [facility] is referred by Dr Raissa Soliz a rheumatology consult        Patient reports over the past 15 years he has experienced recurrent pain and swelling affecting his bilateral knees, primarily on the right side   He was seen by Rheumatology in [facility] for these symptoms a few years ago and had a thorough rheumatological workup done which was unrevealing   His presentation was thought to be consistent with primary osteoarthritis  [facility] has received intra-articular cortisone injections 1-2 times and this has helped him significantly   No recent injections  [facility] has also had multiple arthrocentesis performed which apparently have been unrevealing   His most recent one was done in April 2019 and showed a synovial fluid WBC count of 2464 with a negative synovial fluid crystal and Lyme PCR analysis  Matty Kwon was a note mentioning extensive inflammation and reactive changes were present  [facility] has not had knee x-rays done in a few years but apparently this has showed tricompartmental narrowing with joint effusions   He has not had an MRI of his knees      He reports over the past 15 years his symptoms have progressively worsened where he experiences some degree of pain and swelling on a daily and constant basis, but states that the swelling can flare-up   He reports over the past 15 years he has also had intermittent pain that will affect his bilateral hands (in the PIP and MCP joints), wrists, ankles and feet   These episodes are usually short-lived and infrequent   He denies any joint pains in his elbows, shoulders, low back or hips   Other than the knee swelling he denies other joint swelling   He experiences morning stiffness which affects his knees and can take 2-3 hours to improve   He has tried naproxen in the past which has provided him with mild relief which is short-lived  Christine Santiago has been on diclofenac 75 mg twice a day as needed and states that this does not really help  Christine Santiago has not been prescribed repeat courses of steroids in the past  Christine Santiago is currently on a methylprednisolone Dosepak that was started on July 10th after he was seen in the urgent care for a skin rash that he has been experiencing on his knees for the past 5 days  Christine Santiago reports with his current steroid use this has significantly helped with the bilateral knee pain and swelling and he feels like the skin rash is also clearing up  Christine Santiago reports that the skin rash has occurred for the first time and is not a recurrent issue      He mentions over the past 2-3 months he has been experiencing fevers up to 102° F which will last 24 hours and occurs every 7-10 days  Christine Santiago has not seen his primary care physician for this  Christine Santiago denies night sweats, unintentional weight loss, inflammatory eye disease (he does experience chronic dry eyes and occasional dry mouth), other types of skin rash, psoriasis, mouth/nose ulcers (can occasionally experience a canker sore related to increased stress), swollen glands, pleuritic chest pain, inflammatory bowel disease, blood clots or a family history of autoimmune disease  Christine Santiago denies any other medical conditions or symptoms and reports that he is otherwise healthy         9/3/2021:  Patient presents for a follow up visit today  The labs showed a positive ZULEMA 1:160 dense fine speckled pattern  A C reactive protein was slightly elevated at 15  A ferritin, CK, ESR, uric acid, HLA B27 antigen, anti CCP antibody, rheumatoid factor, Sjogren's antibodies and hepatitis panel were unremarkable    X-rays of the bilateral knees showed osteoarthritis with small joint effusions      After the last office visit we discussed discontinuing the diclofenac as it was not helping with his symptoms  I replaced this with meloxicam 15 mg once a day as needed and he states that this has been helping him with his overall joint pains, but he still experiences pain in his knees as well as at the heels of his bilateral feet, which is more prominent first thing in the morning with the first few steps and then gradually improves  No other significant joint pains at this time  He still notices swelling of his knees, more prominently on the right side  He states that taking meloxicam in combination with the multivitamin seems to be helping him as well  Since starting the meloxicam he has not had any fevers  No other complaints today        3/4/2022:  Patient presents for a follow-up of bilateral knee pain  He is currently on meloxicam 15 mg once daily  He did not have a chance to do the ZULEMA related blood work after the last office visit  I did review the MRI of his right knee which showed:    Longitudinal tear of the medial meniscus      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  Grade 3 patellofemoral chondrosis      Moderate joint effusion with lipoma arborescens  This is nonspecific, but not necessarily indicative of inflammatory arthritis  In this patient, it is more likely related to osteoarthritis      No bony erosive changes are seen  There is no tenosynovitis of the knee  He reports overall with the meloxicam he has been doing very well and denies any knee pain, swelling or stiffness  No additional joint complaints  He is also taking Osteo Bi-Flex supplements which he thinks helps  No past medical history on file        Past Surgical History:   Procedure Laterality Date    APPENDECTOMY      DENTAL SURGERY         Current Outpatient Medications   Medication Sig Dispense Refill    meloxicam (MOBIC) 15 mg tablet Take 1 tablet (15 mg total) by mouth daily as needed for moderate pain 90 tablet 3    triamcinolone (KENALOG) 0 1 % cream Apply topically 2 (two) times a day 30 g 0     No current facility-administered medications for this visit  No Known Allergies      Review of Systems  Constitutional: Negative for weight change, fevers, chills, night sweats, fatigue  ENT/Mouth: Negative for hearing changes, ear pain, nasal congestion, sinus pain, hoarseness, sore throat, rhinorrhea, swallowing difficulty  Eyes: Negative for pain, redness, discharge, vision changes  Cardiovascular: Negative for chest pain, SOB, palpitations  Respiratory: Negative for cough, sputum, wheezing, dyspnea  Gastrointestinal: Negative for nausea, vomiting, diarrhea, constipation, pain, heartburn  Genitourinary: Negative for dysuria, urinary frequency, hematuria  Musculoskeletal: As per HPI  Skin: Negative for skin rash, color changes  Neuro: Negative for weakness, numbness, tingling, loss of consciousness  Psych: Negative for anxiety, depression  Heme/Lymph: Negative for easy bruising, bleeding, lymphadenopathy  Video Exam    There were no vitals filed for this visit  Physical Exam   It was my intent to perform this visit via video technology but I was not able to do a video connection so the visit was completed via audio telephone only  Assessment and plan:  Mr Joanne Villagran a 57-year-old male with history significant for bilateral knee osteoarthritis (presumed to be primary in nature), who presents for follow up of this        - Fredi presents today for a follow-up of bilateral knee pain which has been ongoing since at least 2007 with evaluation being done to determine if this is secondary to primary osteoarthritis versus an inflammatory arthritis    Although he does describe inflammatory symptoms such as recurrent episodes of swelling and morning stiffness of the knees along with an excellent response to oral steroids, his serological workup has been fairly unrevealing and an MRI of his right knee done in October 2021 was more suggestive of primary osteoarthritis  I will continue to manage him for this  - As he has achieved excellent response with meloxicam 15 mg once a day as needed as well as Osteo Bi-Flex supplements he will continue with this regimen  I requested he try to minimize NSAID use if possible in order to avoid potential side effects related to long-term use  A CBC and CMP will be monitored periodically  - In regards to the ZULEMA dense fine speckled pattern this has less of an association with autoimmune diseases but I will complete a full panel to ensure he does not have specific antibodies present  He does not describe symptoms that would be concerning for a lupus-like disease  I spent 11 minutes directly with the patient during this visit  VIRTUAL VISIT DISCLAIMER      Taejuleeshabbir SutherlandAileen verbally agrees to participate in [employer]  Pt is aware that [employer] could be limited without vital signs or the ability to perform a full hands-on physical Osie Rohiths understands he or the provider may request at any time to terminate the video visit and request the patient to seek care or treatment in person

## 2022-03-04 NOTE — TELEPHONE ENCOUNTER
Please print and mail the labs I ordered today  Please schedule him for a follow-up in 1 year, thanks

## 2022-04-22 LAB
ALBUMIN SERPL-MCNC: 4.4 G/DL (ref 3.8–4.9)
ALBUMIN/GLOB SERPL: 1.8 {RATIO} (ref 1.2–2.2)
ALP SERPL-CCNC: 79 IU/L (ref 44–121)
ALT SERPL-CCNC: 16 IU/L (ref 0–44)
APPEARANCE UR: CLEAR
APTT HEX PL PPP: 0 SEC
APTT IMM NP PPP: NORMAL SEC
APTT PPP 1:1 SALINE: NORMAL SEC
APTT PPP: 29 SEC
AST SERPL-CCNC: 17 IU/L (ref 0–40)
B2 GLYCOPROT1 IGA SER-ACNC: 14 GPI IGA UNITS (ref 0–25)
B2 GLYCOPROT1 IGA SER-ACNC: <10 SAU
B2 GLYCOPROT1 IGG SER-ACNC: <10 SGU
B2 GLYCOPROT1 IGG SER-ACNC: <9 GPI IGG UNITS (ref 0–20)
B2 GLYCOPROT1 IGM SER-ACNC: <10 SMU
B2 GLYCOPROT1 IGM SER-ACNC: <9 GPI IGM UNITS (ref 0–32)
BACTERIA URNS QL MICRO: NORMAL
BASOPHILS # BLD AUTO: 0 X10E3/UL (ref 0–0.2)
BASOPHILS NFR BLD AUTO: 1 %
BILIRUB SERPL-MCNC: 0.7 MG/DL (ref 0–1.2)
BILIRUB UR QL STRIP: NEGATIVE
BUN SERPL-MCNC: 14 MG/DL (ref 6–24)
BUN/CREAT SERPL: 15 (ref 9–20)
C3 SERPL-MCNC: 126 MG/DL (ref 82–167)
C4 SERPL-MCNC: 33 MG/DL (ref 12–38)
CALCIUM SERPL-MCNC: 9.4 MG/DL (ref 8.7–10.2)
CARDIOLIPIN IGG SER IA-ACNC: <10 GPL
CARDIOLIPIN IGG SER IA-ACNC: <9 GPL U/ML (ref 0–14)
CARDIOLIPIN IGM SER IA-ACNC: <10 MPL
CARDIOLIPIN IGM SER IA-ACNC: <9 MPL U/ML (ref 0–12)
CASTS URNS QL MICRO: NORMAL /LPF
CENTROMERE B AB SER-ACNC: 0.2 AI (ref 0–0.9)
CHLORIDE SERPL-SCNC: 106 MMOL/L (ref 96–106)
CHROMATIN AB SERPL-ACNC: <0.2 AI (ref 0–0.9)
CO2 SERPL-SCNC: 19 MMOL/L (ref 20–29)
COLOR UR: YELLOW
CONFIRM DRVVT: NORMAL SEC
CREAT SERPL-MCNC: 0.91 MG/DL (ref 0.76–1.27)
CREAT UR-MCNC: 138.8 MG/DL
DRVVT SCREEN TO CONFIRM RATIO: NORMAL RATIO
DSDNA AB SER-ACNC: <1 IU/ML (ref 0–9)
EGFR: 100 ML/MIN/1.73
ENA JO1 AB SER-ACNC: <0.2 AI (ref 0–0.9)
ENA RNP AB SER-ACNC: 0.2 AI (ref 0–0.9)
ENA SCL70 AB SER-ACNC: <0.2 AI (ref 0–0.9)
ENA SM AB SER-ACNC: <0.2 AI (ref 0–0.9)
ENA SS-A AB SER-ACNC: <0.2 AI (ref 0–0.9)
ENA SS-B AB SER-ACNC: <0.2 AI (ref 0–0.9)
EOSINOPHIL # BLD AUTO: 0.4 X10E3/UL (ref 0–0.4)
EOSINOPHIL NFR BLD AUTO: 7 %
EPI CELLS #/AREA URNS HPF: NORMAL /HPF (ref 0–10)
ERYTHROCYTE [DISTWIDTH] IN BLOOD BY AUTOMATED COUNT: 14.2 % (ref 11.6–15.4)
GLOBULIN SER-MCNC: 2.4 G/DL (ref 1.5–4.5)
GLUCOSE SERPL-MCNC: 93 MG/DL (ref 65–99)
GLUCOSE UR QL: NEGATIVE
HCT VFR BLD AUTO: 43.3 % (ref 37.5–51)
HGB BLD-MCNC: 15.2 G/DL (ref 13–17.7)
HGB UR QL STRIP: NEGATIVE
IMM GRANULOCYTES # BLD: 0 X10E3/UL (ref 0–0.1)
IMM GRANULOCYTES NFR BLD: 0 %
INR PPP: 1 RATIO
KETONES UR QL STRIP: NEGATIVE
LA NT PLATELET PPP: 0.2 SEC
LA PPP-IMP: NORMAL
LEUKOCYTE ESTERASE UR QL STRIP: NEGATIVE
LYMPHOCYTES # BLD AUTO: 1.9 X10E3/UL (ref 0.7–3.1)
LYMPHOCYTES NFR BLD AUTO: 34 %
MCH RBC QN AUTO: 27.8 PG (ref 26.6–33)
MCHC RBC AUTO-ENTMCNC: 35.1 G/DL (ref 31.5–35.7)
MCV RBC AUTO: 79 FL (ref 79–97)
MICRO URNS: NORMAL
MICRO URNS: NORMAL
MONOCYTES # BLD AUTO: 0.5 X10E3/UL (ref 0.1–0.9)
MONOCYTES NFR BLD AUTO: 9 %
NEUTROPHILS # BLD AUTO: 2.8 X10E3/UL (ref 1.4–7)
NEUTROPHILS NFR BLD AUTO: 49 %
NITRITE UR QL STRIP: NEGATIVE
PH UR STRIP: 5.5 [PH] (ref 5–7.5)
PLATELET # BLD AUTO: 231 X10E3/UL (ref 150–450)
POTASSIUM SERPL-SCNC: 4.1 MMOL/L (ref 3.5–5.2)
PROT SERPL-MCNC: 6.8 G/DL (ref 6–8.5)
PROT UR QL STRIP: NEGATIVE
PROT UR-MCNC: 10.4 MG/DL
PROT/CREAT UR: 75 MG/G CREAT (ref 0–200)
PROTHROMBIN TIME: 10.4 SEC
RBC # BLD AUTO: 5.47 X10E6/UL (ref 4.14–5.8)
RBC #/AREA URNS HPF: NORMAL /HPF (ref 0–2)
SCREEN DRVVT: 35.2 SEC
SL AMB SEE BELOW:: NORMAL
SODIUM SERPL-SCNC: 143 MMOL/L (ref 134–144)
SP GR UR: 1.02 (ref 1–1.03)
THROMBIN TIME: 16.5 SEC
UROBILINOGEN UR STRIP-ACNC: 0.2 MG/DL (ref 0.2–1)
WBC # BLD AUTO: 5.7 X10E3/UL (ref 3.4–10.8)
WBC #/AREA URNS HPF: NORMAL /HPF (ref 0–5)

## 2022-05-14 ENCOUNTER — OFFICE VISIT (OUTPATIENT)
Dept: URGENT CARE | Facility: CLINIC | Age: 57
End: 2022-05-14
Payer: COMMERCIAL

## 2022-05-14 VITALS
SYSTOLIC BLOOD PRESSURE: 139 MMHG | HEIGHT: 63 IN | BODY MASS INDEX: 34.55 KG/M2 | WEIGHT: 195 LBS | RESPIRATION RATE: 18 BRPM | HEART RATE: 110 BPM | TEMPERATURE: 97.5 F | DIASTOLIC BLOOD PRESSURE: 81 MMHG | OXYGEN SATURATION: 99 %

## 2022-05-14 DIAGNOSIS — J03.90 ACUTE TONSILLITIS, UNSPECIFIED ETIOLOGY: Primary | ICD-10-CM

## 2022-05-14 PROCEDURE — 99213 OFFICE O/P EST LOW 20 MIN: CPT | Performed by: PHYSICIAN ASSISTANT

## 2022-05-14 PROCEDURE — 3008F BODY MASS INDEX DOCD: CPT | Performed by: PHYSICIAN ASSISTANT

## 2022-05-14 PROCEDURE — 1036F TOBACCO NON-USER: CPT | Performed by: PHYSICIAN ASSISTANT

## 2022-05-14 RX ORDER — AMOXICILLIN 875 MG/1
875 TABLET, COATED ORAL 2 TIMES DAILY
Qty: 14 TABLET | Refills: 0 | Status: SHIPPED | OUTPATIENT
Start: 2022-05-14 | End: 2022-05-21

## 2022-05-14 NOTE — PROGRESS NOTES
[facility]        NAME: Jonathon Ervin is a 49 y o  male  : 1965    MRN: [id number]  DATE: May 14, 2022  TIME: 8:46 AM    Assessment and Plan   Acute tonsillitis, unspecified etiology [J03 90]  1  Acute tonsillitis, unspecified etiology  amoxicillin (AMOXIL) 875 mg tablet         Patient Instructions     Patient Instructions   Take antibiotic as prescribed  Can continue over-the-counter cough and cold medication  Recommend daily allergy pill such as Claritin or Zyrtec  Follow-up with PCP  Return or be seen in ER with any progressing or worsening symptoms  Follow up with PCP in 3-5 days  Proceed to  ER if symptoms worsen  Chief Complaint     Chief Complaint   Patient presents with    URI     Pt presents with URI s/s started on Thursday with cough and congestion  History of Present Illness       Patient is a 61-year-old male presenting today with sore throat times 4 days  Patient notes over the last few days has progressively worsening sore throat, has been taking over-the-counter ibuprofen and drinking warm teas which he states provides some resolution, notes that pain and discomfort is made worse with swallowing and when speaking  Denies fever, trouble swallowing, voice change, N/V/D  Denies any known sick contacts  Review of Systems   Review of Systems   Constitutional: Negative for chills and fever  HENT: Positive for sore throat  Negative for ear pain  Eyes: Negative for pain and visual disturbance  Respiratory: Negative for cough and shortness of breath  Cardiovascular: Negative for chest pain and palpitations  Gastrointestinal: Negative for abdominal pain and vomiting  Genitourinary: Negative for dysuria and hematuria  Musculoskeletal: Negative for arthralgias and back pain  Skin: Negative for color change and rash  Neurological: Negative for seizures and syncope  All other systems reviewed and are negative          Current Medications       Current Outpatient Medications:     amoxicillin (AMOXIL) 875 mg tablet, Take 1 tablet (875 mg total) by mouth in the morning and 1 tablet (875 mg total) in the evening  Do all this for 7 days  , Disp: 14 tablet, Rfl: 0    meloxicam (MOBIC) 15 mg tablet, Take 1 tablet (15 mg total) by mouth daily as needed for moderate pain, Disp: 90 tablet, Rfl: 3    triamcinolone (KENALOG) 0 1 % cream, Apply topically 2 (two) times a day, Disp: 30 g, Rfl: 0    Current Allergies     Allergies as of 05/14/2022    (No Known Allergies)            The following portions of the patient's history were reviewed and updated as appropriate: allergies, current medications, past family history, past medical history, past social history, past surgical history and problem list      History reviewed  No pertinent past medical history  Past Surgical History:   Procedure Laterality Date    APPENDECTOMY      DENTAL SURGERY         History reviewed  No pertinent family history  Medications have been verified  Objective   /81   Pulse (!) 110   Temp 97 5 °F (36 4 °C)   Resp 18   Ht 5' 3" (1 6 m)   Wt 88 5 kg (195 lb)   SpO2 99%   BMI 34 54 kg/m²        Physical Exam     Physical Exam  Vitals and nursing note reviewed  Constitutional:       General: He is not in acute distress  Appearance: Normal appearance  He is not toxic-appearing  HENT:      Head: Normocephalic and atraumatic  Right Ear: Tympanic membrane, ear canal and external ear normal       Left Ear: Tympanic membrane, ear canal and external ear normal       Nose: Nose normal       Mouth/Throat:      Mouth: Mucous membranes are moist       Pharynx: No pharyngeal swelling or posterior oropharyngeal erythema  Tonsils: No tonsillar exudate  1+ on the right  1+ on the left  Eyes:      Conjunctiva/sclera: Conjunctivae normal    Cardiovascular:      Rate and Rhythm: Normal rate and regular rhythm  Pulses: Normal pulses  Heart sounds: Normal heart sounds  Pulmonary:      Effort: Pulmonary effort is normal       Breath sounds: Normal breath sounds  Lymphadenopathy:      Cervical: Cervical adenopathy present  Skin:     General: Skin is warm  Capillary Refill: Capillary refill takes less than 2 seconds  Neurological:      General: No focal deficit present  Mental Status: He is alert and oriented to person, place, and time

## 2022-05-14 NOTE — PATIENT INSTRUCTIONS
Take antibiotic as prescribed  Can continue over-the-counter cough and cold medication  Recommend daily allergy pill such as Claritin or Zyrtec  Follow-up with PCP  Return or be seen in ER with any progressing or worsening symptoms

## 2022-09-06 ENCOUNTER — VBI (OUTPATIENT)
Dept: ADMINISTRATIVE | Facility: OTHER | Age: 57
End: 2022-09-06

## 2023-03-03 ENCOUNTER — TELEPHONE (OUTPATIENT)
Dept: RHEUMATOLOGY | Facility: CLINIC | Age: 58
End: 2023-03-03

## 2023-03-03 ENCOUNTER — TELEMEDICINE (OUTPATIENT)
Dept: RHEUMATOLOGY | Facility: CLINIC | Age: 58
End: 2023-03-03

## 2023-03-03 DIAGNOSIS — M17.0 OSTEOARTHRITIS OF BOTH KNEES, UNSPECIFIED OSTEOARTHRITIS TYPE: ICD-10-CM

## 2023-03-03 DIAGNOSIS — Z79.1 NSAID LONG-TERM USE: ICD-10-CM

## 2023-03-03 DIAGNOSIS — R10.9 STOMACH PAIN: ICD-10-CM

## 2023-03-03 DIAGNOSIS — R76.8 POSITIVE ANA (ANTINUCLEAR ANTIBODY): Primary | ICD-10-CM

## 2023-03-03 NOTE — PROGRESS NOTES
Virtual Regular Visit    Verification of patient location:    Patient is located in the following state in which I hold an active license [place]      Assessment/Plan:    Problem List Items Addressed This Visit        Musculoskeletal and Integument    Osteoarthritis of knee    Relevant Orders    C-reactive protein    Sedimentation rate, automated   Other Visit Diagnoses     Positive ZULEMA (antinuclear antibody)    -  Primary    NSAID long-term use        Relevant Orders    CBC and differential    Comprehensive metabolic panel    Ambulatory Referral to Gastroenterology    Stomach pain        Relevant Orders    Ambulatory Referral to Gastroenterology               Reason for visit is follow up  Chief Complaint   Patient presents with   • Virtual Regular Visit        Encounter provider Sheila Cortez MD    Provider located at [address] [place] [id number] [phone]      Recent Visits  No visits were found meeting these conditions  Showing recent visits within past 7 days and meeting all other requirements  Today's Visits  Date Type Provider Dept   03/03/23 Telemedicine Sheila Cortez MD [facility] today's visits and meeting all other requirements  Future Appointments  No visits were found meeting these conditions  Showing future appointments within next 150 days and meeting all other requirements       The patient was identified by name and date of birth  Rodrick Quique was informed that this is a telemedicine visit and that the visit is being conducted through Telephone  My office door was closed  No one else was in the room  He acknowledged consent and understanding of privacy and security of the video platform  The patient has agreed to participate and understands they can discontinue the visit at any time  Patient is aware this is a billable service         Subjective    HPI INITIAL VISIT NOTE (7/2021):  Mr Nils Ontiveros a 53-year-old male with history significant for bilateral knee osteoarthritis (presumed to be primary in nature), who presents for further evaluation of this  [facility] is referred by Dr Yulisa Downs a rheumatology consult        Patient reports over the past 15 years he has experienced recurrent pain and swelling affecting his bilateral knees, primarily on the right side   He was seen by Rheumatology in [facility] for these symptoms a few years ago and had a thorough rheumatological workup done which was unrevealing   His presentation was thought to be consistent with primary osteoarthritis  [facility] has received intra-articular cortisone injections 1-2 times and this has helped him significantly   No recent injections  [facility] has also had multiple arthrocentesis performed which apparently have been unrevealing   His most recent one was done in April 2019 and showed a synovial fluid WBC count of 2464 with a negative synovial fluid crystal and Lyme PCR analysis  Sammy Flores was a note mentioning extensive inflammation and reactive changes were present  [facility] has not had knee x-rays done in a few years but apparently this has showed tricompartmental narrowing with joint effusions   He has not had an MRI of his knees      He reports over the past 15 years his symptoms have progressively worsened where he experiences some degree of pain and swelling on a daily and constant basis, but states that the swelling can flare-up   He reports over the past 15 years he has also had intermittent pain that will affect his bilateral hands (in the PIP and MCP joints), wrists, ankles and feet   These episodes are usually short-lived and infrequent   He denies any joint pains in his elbows, shoulders, low back or hips   Other than the knee swelling he denies other joint swelling   He experiences morning stiffness which affects his knees and can take 2-3 hours to improve   He has tried naproxen in the past which has provided him with mild relief which is short-lived  [facility] has been on diclofenac 75 mg twice a day as needed and states that this does not really help  [facility] has not been prescribed repeat courses of steroids in the past  [facility] is currently on a methylprednisolone Dosepak that was started on July 10th after he was seen in the urgent care for a skin rash that he has been experiencing on his knees for the past 5 days  [facility] reports with his current steroid use this has significantly helped with the bilateral knee pain and swelling and he feels like the skin rash is also clearing up   He reports that the skin rash has occurred for the first time and is not a recurrent issue      He mentions over the past 2-3 months he has been experiencing fevers up to 102° F which will last 24 hours and occurs every 7-10 days  [facility] has not seen his primary care physician for this  [facility] denies night sweats, unintentional weight loss, inflammatory eye disease (he does experience chronic dry eyes and occasional dry mouth), other types of skin rash, psoriasis, mouth/nose ulcers (can occasionally experience a canker sore related to increased stress), swollen glands, pleuritic chest pain, inflammatory bowel disease, blood clots or a family history of autoimmune disease  [facility] denies any other medical conditions or symptoms and reports that he is otherwise healthy         9/3/2021:  Patient presents for a follow up visit today  [facility] Hurtadoas labs showed a positive ZULEMA 1:160 dense fine speckled pattern   A C reactive protein was slightly elevated at 15   A ferritin, CK, ESR, uric acid, HLA B27 antigen, anti CCP antibody, rheumatoid factor, Sjogren's antibodies and hepatitis panel were unremarkable   X-rays of the bilateral knees showed osteoarthritis with small joint effusions      After the last office visit we discussed discontinuing the diclofenac as it was not helping with his symptoms   I replaced this with meloxicam 15 mg once a day as needed and he states that this has been helping him with his overall joint pains, but he still experiences pain in his knees as well as at the heels of his bilateral feet, which is more prominent first thing in the morning with the first few steps and then gradually improves   No other significant joint pains at this time  [facility] still notices swelling of his knees, more prominently on the right side   He states that taking meloxicam in combination with the multivitamin seems to be helping him as well   Since starting the meloxicam he has not had any fevers   No other complaints today         3/4/2022:  Patient presents for a follow-up of bilateral knee pain  He is currently on meloxicam 15 mg once daily  He did not have a chance to do the ZULEMA related blood work after the last office visit  I did review the MRI of his right knee which showed:     Longitudinal tear of the medial meniscus      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   Grade 3 patellofemoral chondrosis      Moderate joint effusion with lipoma arborescens   This is nonspecific, but not necessarily indicative of inflammatory arthritis   In this patient, it is more likely related to osteoarthritis      No bony erosive changes are seen  Natasha Kirkpatrick is no tenosynovitis of the knee      He reports overall with the meloxicam he has been doing very well and denies any knee pain, swelling or stiffness  No additional joint complaints  He is also taking Osteo Bi-Flex supplements which he thinks helps  3/3/2023:  Patient presents for a follow-up of bilateral knee osteoarthritis  He is currently on meloxicam 15 mg once a day as needed  I reviewed his labs done after the last office visit which showed an unremarkable CBC, CMP, ZULEMA specificity, antiphospholipid antibody testing, C3, C4, urine analysis and urine protein creatinine ratio      He reports he has overall done well over the past year with taking meloxicam 15 mg once a day as needed as well as Osteo Bi-Flex  No concerning joint pains, swelling or stiffness  He reports if he tries to miss the meloxicam for a few days he will notice a change in his symptoms  His only other complaint is experiencing abdominal pain which improves after he eats  He has also been taking Tums as needed which helps  No vomiting, blood in stools or dark stools  No past medical history on file  Past Surgical History:   Procedure Laterality Date   • APPENDECTOMY     • DENTAL SURGERY         Current Outpatient Medications   Medication Sig Dispense Refill   • meloxicam (MOBIC) 15 mg tablet Take 1 tablet (15 mg total) by mouth daily as needed for moderate pain 90 tablet 3   • triamcinolone (KENALOG) 0 1 % cream Apply topically 2 (two) times a day 30 g 0     No current facility-administered medications for this visit  No Known Allergies      Review of Systems  Constitutional: Negative for weight change, fevers, chills, night sweats, fatigue  ENT/Mouth: Negative for hearing changes, ear pain, nasal congestion, sinus pain, hoarseness, sore throat, rhinorrhea, swallowing difficulty  Eyes: Negative for pain, redness, discharge, vision changes  Cardiovascular: Negative for chest pain, SOB, palpitations  Respiratory: Negative for cough, sputum, wheezing, dyspnea  Gastrointestinal: Negative for nausea, vomiting, diarrhea, constipation  Positive for pain and heartburn  Genitourinary: Negative for dysuria, urinary frequency, hematuria  Musculoskeletal: As per HPI  Skin: Negative for skin rash, color changes  Neuro: Negative for weakness, numbness, tingling, loss of consciousness  Psych: Negative for anxiety, depression  Heme/Lymph: Negative for easy bruising, bleeding, lymphadenopathy  Assessment and plan:  Mr Fidelia Page a 47-year-old male with history significant for bilateral knee osteoarthritis (presumed to be primary in nature) who presents for a follow-up    He is currently on meloxicam 15 mg once a day as needed      - Fredi presents today for a follow-up of bilateral knee pain which has been ongoing since at least 2007 with evaluation being done to determine if this is secondary to primary osteoarthritis versus an inflammatory arthritis  Although he does describe inflammatory symptoms such as recurrent episodes of swelling and morning stiffness of the knees along with an excellent response to oral steroids, his serological workup has been fairly unrevealing and an MRI of his right knee done in October 2021 was more suggestive of primary osteoarthritis  I will continue to manage him for this      - Since the last office visit he has been taking meloxicam 15 mg mostly on a daily basis as well as Osteo Bi-Flex which he reports significantly helps with his symptoms  He is asymptomatic from a joint perspective but has been experiencing abdominal pain which improves after eating and also with use of Tums  This is concerning for gastritis or possibly an ulcer given the long-term NSAID use  In view of this I would like him to start daily omeprazole over-the-counter and minimize if not discontinue NSAID use for now  I will also refer him to gastroenterology to see if he needs an upper endoscopy  - As he is also on long-term NSAID use I will update a CBC and CMP       I spent 12 minutes directly with the patient during this visit

## 2023-10-06 DIAGNOSIS — M17.0 OSTEOARTHRITIS OF BOTH KNEES, UNSPECIFIED OSTEOARTHRITIS TYPE: ICD-10-CM

## 2023-10-06 RX ORDER — MELOXICAM 15 MG/1
15 TABLET ORAL DAILY PRN
Qty: 90 TABLET | Refills: 0 | Status: SHIPPED | OUTPATIENT
Start: 2023-10-06

## 2024-01-12 ENCOUNTER — APPOINTMENT (OUTPATIENT)
Dept: LAB | Facility: CLINIC | Age: 59
End: 2024-01-12
Payer: COMMERCIAL

## 2024-01-12 DIAGNOSIS — Z79.1 NSAID LONG-TERM USE: ICD-10-CM

## 2024-01-12 DIAGNOSIS — M17.0 OSTEOARTHRITIS OF BOTH KNEES, UNSPECIFIED OSTEOARTHRITIS TYPE: ICD-10-CM

## 2024-01-12 LAB
ALBUMIN SERPL BCP-MCNC: 4.3 G/DL (ref 3.5–5)
ALP SERPL-CCNC: 69 U/L (ref 34–104)
ALT SERPL W P-5'-P-CCNC: 14 U/L (ref 7–52)
ANION GAP SERPL CALCULATED.3IONS-SCNC: 12 MMOL/L
AST SERPL W P-5'-P-CCNC: 21 U/L (ref 13–39)
BASOPHILS # BLD AUTO: 0.09 THOUSANDS/ÂΜL (ref 0–0.1)
BASOPHILS NFR BLD AUTO: 2 % (ref 0–1)
BILIRUB SERPL-MCNC: 0.53 MG/DL (ref 0.2–1)
BUN SERPL-MCNC: 17 MG/DL (ref 5–25)
CALCIUM SERPL-MCNC: 9.4 MG/DL (ref 8.4–10.2)
CHLORIDE SERPL-SCNC: 105 MMOL/L (ref 96–108)
CO2 SERPL-SCNC: 25 MMOL/L (ref 21–32)
CREAT SERPL-MCNC: 0.93 MG/DL (ref 0.6–1.3)
CRP SERPL QL: 2.3 MG/L
EOSINOPHIL # BLD AUTO: 0.5 THOUSAND/ÂΜL (ref 0–0.61)
EOSINOPHIL NFR BLD AUTO: 8 % (ref 0–6)
ERYTHROCYTE [DISTWIDTH] IN BLOOD BY AUTOMATED COUNT: 19.6 % (ref 11.6–15.1)
ERYTHROCYTE [SEDIMENTATION RATE] IN BLOOD: 17 MM/HOUR (ref 0–19)
GFR SERPL CREATININE-BSD FRML MDRD: 91 ML/MIN/1.73SQ M
GLUCOSE P FAST SERPL-MCNC: 90 MG/DL (ref 65–99)
HCT VFR BLD AUTO: 39.4 % (ref 36.5–49.3)
HGB BLD-MCNC: 11.6 G/DL (ref 12–17)
IMM GRANULOCYTES # BLD AUTO: 0.02 THOUSAND/UL (ref 0–0.2)
IMM GRANULOCYTES NFR BLD AUTO: 0 % (ref 0–2)
LYMPHOCYTES # BLD AUTO: 2.5 THOUSANDS/ÂΜL (ref 0.6–4.47)
LYMPHOCYTES NFR BLD AUTO: 42 % (ref 14–44)
MCH RBC QN AUTO: 20.6 PG (ref 26.8–34.3)
MCHC RBC AUTO-ENTMCNC: 29.4 G/DL (ref 31.4–37.4)
MCV RBC AUTO: 70 FL (ref 82–98)
MONOCYTES # BLD AUTO: 0.61 THOUSAND/ÂΜL (ref 0.17–1.22)
MONOCYTES NFR BLD AUTO: 10 % (ref 4–12)
NEUTROPHILS # BLD AUTO: 2.23 THOUSANDS/ÂΜL (ref 1.85–7.62)
NEUTS SEG NFR BLD AUTO: 38 % (ref 43–75)
NRBC BLD AUTO-RTO: 0 /100 WBCS
PLATELET # BLD AUTO: 250 THOUSANDS/UL (ref 149–390)
POTASSIUM SERPL-SCNC: 4.3 MMOL/L (ref 3.5–5.3)
PROT SERPL-MCNC: 6.6 G/DL (ref 6.4–8.4)
RBC # BLD AUTO: 5.62 MILLION/UL (ref 3.88–5.62)
SODIUM SERPL-SCNC: 142 MMOL/L (ref 135–147)
WBC # BLD AUTO: 5.95 THOUSAND/UL (ref 4.31–10.16)

## 2024-01-12 PROCEDURE — 86140 C-REACTIVE PROTEIN: CPT

## 2024-01-12 PROCEDURE — 36415 COLL VENOUS BLD VENIPUNCTURE: CPT

## 2024-01-12 PROCEDURE — 85652 RBC SED RATE AUTOMATED: CPT

## 2024-01-12 PROCEDURE — 80053 COMPREHEN METABOLIC PANEL: CPT

## 2024-01-12 PROCEDURE — 85025 COMPLETE CBC W/AUTO DIFF WBC: CPT

## 2024-01-18 ENCOUNTER — TELEPHONE (OUTPATIENT)
Age: 59
End: 2024-01-18

## 2024-01-18 NOTE — TELEPHONE ENCOUNTER
Pt called for an estimate to tomorrows ov. (Susi at the office helped me with this)The estimate was $164.00 and I informed pt, pt understood and asked if he can pay tomorrow at the ov. I informed him to get there early so he can pay and sign forms. Per pt okay and thank you.

## 2024-01-29 ENCOUNTER — OFFICE VISIT (OUTPATIENT)
Dept: FAMILY MEDICINE CLINIC | Facility: CLINIC | Age: 59
End: 2024-01-29

## 2024-01-29 VITALS
DIASTOLIC BLOOD PRESSURE: 70 MMHG | HEIGHT: 63 IN | HEART RATE: 68 BPM | TEMPERATURE: 98 F | WEIGHT: 201.8 LBS | RESPIRATION RATE: 17 BRPM | OXYGEN SATURATION: 97 % | SYSTOLIC BLOOD PRESSURE: 120 MMHG | BODY MASS INDEX: 35.75 KG/M2

## 2024-01-29 DIAGNOSIS — Z13.228 SCREENING FOR ENDOCRINE, METABOLIC AND IMMUNITY DISORDER: ICD-10-CM

## 2024-01-29 DIAGNOSIS — E78.5 DYSLIPIDEMIA, GOAL LDL BELOW 130: ICD-10-CM

## 2024-01-29 DIAGNOSIS — Z12.5 SCREENING FOR PROSTATE CANCER: ICD-10-CM

## 2024-01-29 DIAGNOSIS — Z13.0 SCREENING FOR ENDOCRINE, METABOLIC AND IMMUNITY DISORDER: ICD-10-CM

## 2024-01-29 DIAGNOSIS — Z13.29 SCREENING FOR ENDOCRINE, METABOLIC AND IMMUNITY DISORDER: ICD-10-CM

## 2024-01-29 DIAGNOSIS — Z23 NEED FOR VACCINATION: ICD-10-CM

## 2024-01-29 DIAGNOSIS — D50.9 IRON DEFICIENCY ANEMIA, UNSPECIFIED IRON DEFICIENCY ANEMIA TYPE: Primary | ICD-10-CM

## 2024-01-29 DIAGNOSIS — Z13.6 SCREENING FOR CARDIOVASCULAR CONDITION: ICD-10-CM

## 2024-01-29 DIAGNOSIS — M17.0 PRIMARY OSTEOARTHRITIS OF BOTH KNEES: ICD-10-CM

## 2024-01-29 PROCEDURE — 90471 IMMUNIZATION ADMIN: CPT

## 2024-01-29 PROCEDURE — 90686 IIV4 VACC NO PRSV 0.5 ML IM: CPT

## 2024-01-29 PROCEDURE — 99203 OFFICE O/P NEW LOW 30 MIN: CPT | Performed by: FAMILY MEDICINE

## 2024-01-29 RX ORDER — LANOLIN ALCOHOL/MO/W.PET/CERES
1 CREAM (GRAM) TOPICAL DAILY
COMMUNITY

## 2024-01-29 NOTE — PROGRESS NOTES
Assessment/Plan:  Problem List Items Addressed This Visit        Musculoskeletal and Integument    Primary osteoarthritis of both knees     The patient will continue to follow-up with his rheumatologist as scheduled.            Other    Dyslipidemia, goal LDL below 130     The patient will go for the up-to-date lipid panel as ordered and we will follow-up with the results.  He will continue to work on improving his diet and exercise.  We will see him back in the office as scheduled.         Relevant Orders    Lipid panel    Iron deficiency anemia - Primary     The patient has evidence of iron deficiency anemia on his recent lab work performed by his rheumatologist.  He already has an appointment scheduled with [facility] for further evaluation of this to rule out GI losses.  We will send him for the additional testing as ordered and we will follow-up with the results.         Relevant Orders    Ferritin    Iron Panel (Includes Ferritin, Iron Sat%, Iron, and TIBC)   Other Visit Diagnoses     Need for vaccination        Relevant Orders    influenza vaccine, quadrivalent, 0.5 mL, preservative-free, for adult and pediatric patients 6 mos+ (AFLURIA, FLUARIX, FLULAVAL, FLUZONE) (Completed)    Screening for cardiovascular condition        Relevant Orders    Lipid panel    TSH, 3rd generation with Free T4 reflex    Screening for endocrine, metabolic and immunity disorder        Relevant Orders    Lipid panel    TSH, 3rd generation with Free T4 reflex    Screening for prostate cancer        Relevant Orders    PSA, Total Screen          Return in about 6 months (around 7/29/2024) for Annual physical.   I spent 15 minutes during the visit reviewing the history from the patient, performing the examination, discussing the findings with the patient, providing counseling and education, and making a plan. I spent 15 minutes ordering referrals and testing and documenting.    Subjective:   Chief Complaint   Patient presents with    • Establish Care     New patient check up         Patient ID: Faustino Gallagher is a 58 y.o. male presents today for a new patient checkup to establish care.  Faustino Gallagher is a 58 y.o. male who presents today as a new patient to establish care.  He sees the rheumatologist for OA and takes meloxicam every other day and he also takes Osteo Bi-Flex with relief.  He did have recent labs performed by his rheumatologist with demonstrated evidence of iron deficiency anemia.  They did refer him to GI and he has an appointment scheduled next week to discuss screening for blood loss through the GI tract.  He feels well otherwise.  The patient denies any chest pain, shortness of breath, or palpitations.  There is no edema.  There are no headaches or visual changes.  There is no lightheadedness, dizziness, or fainting spells.    The patient currently denies any nausea, vomiting, or GERD symptoms.  he has normal bowel movements and normal urine output.  he has a normal appetite.  He had recent blood work demonstrating anemia.    He has no other complaints today.        The following portions of the patient's history were reviewed and updated as appropriate: allergies, current medications, past family history, past medical history, past social history, past surgical history and problem list.  Patient Active Problem List   Diagnosis   • Dyslipidemia, goal LDL below 130   • Morphea profunda   • Primary osteoarthritis of both knees   • Iron deficiency anemia     History reviewed. No pertinent past medical history.  Past Surgical History:   Procedure Laterality Date   • APPENDECTOMY  1991   • DENTAL SURGERY      Tooth extraction     No Known Allergies  History reviewed. No pertinent family history.  Social History     Socioeconomic History   • Marital status: /Civil Union     Spouse name: Not on file   • Number of children: Not on file   • Years of education: Not on file   • Highest education level: Not on file  "  Occupational History   • Not on file   Tobacco Use   • Smoking status: Never     Passive exposure: Never   • Smokeless tobacco: Never   Vaping Use   • Vaping status: Never Used   Substance and Sexual Activity   • Alcohol use: Never   • Drug use: Never   • Sexual activity: Not on file   Other Topics Concern   • Not on file   Social History Narrative   • Not on file     Social Determinants of Health     Financial Resource Strain: Not on file   Food Insecurity: Not on file   Transportation Needs: Not on file   Physical Activity: Not on file   Stress: Not on file   Social Connections: Not on file   Intimate Partner Violence: Not on file   Housing Stability: Not on file     Current Outpatient Medications on File Prior to Visit   Medication Sig Dispense Refill   • glucosamine-chondroitin 500-400 MG tablet Take 1 tablet by mouth daily     • meloxicam (MOBIC) 15 mg tablet Take 1 tablet (15 mg total) by mouth daily as needed for moderate pain 90 tablet 0   • [DISCONTINUED] triamcinolone (KENALOG) 0.1 % cream Apply topically 2 (two) times a day 30 g 0     No current facility-administered medications on file prior to visit.     Review of Systems   Constitutional: Negative.    HENT: Negative.     Eyes: Negative.    Respiratory: Negative.     Cardiovascular: Negative.    Gastrointestinal: Negative.    Endocrine: Negative.    Genitourinary: Negative.    Musculoskeletal: Negative.    Skin: Negative.    Allergic/Immunologic: Negative.    Neurological: Negative.    Hematological: Negative.    Psychiatric/Behavioral: Negative.         Objective:  Vitals:    01/29/24 1515 01/29/24 1556   BP: 134/72 120/70   BP Location: Left arm    Patient Position: Sitting    Cuff Size: Large    Pulse: 90 68   Resp: 17    Temp: 98 °F (36.7 °C)    TempSrc: Tympanic    SpO2: 97%    Weight: 91.5 kg (201 lb 12.8 oz)    Height: 5' 3\" (1.6 m)      Body mass index is 35.75 kg/m².     Physical Exam  Vitals and nursing note reviewed.   Constitutional:       " General: He is not in acute distress.     Appearance: Normal appearance. He is well-developed. He is not diaphoretic.   HENT:      Head: Normocephalic and atraumatic.      Right Ear: Tympanic membrane, ear canal and external ear normal.      Left Ear: Tympanic membrane, ear canal and external ear normal.      Nose: No congestion or rhinorrhea.      Mouth/Throat:      Mouth: Mucous membranes are moist.      Pharynx: Oropharynx is clear.   Eyes:      Extraocular Movements: Extraocular movements intact.      Pupils: Pupils are equal, round, and reactive to light.   Neck:      Thyroid: No thyromegaly.      Vascular: No JVD.      Trachea: No tracheal deviation.   Cardiovascular:      Rate and Rhythm: Normal rate and regular rhythm.      Heart sounds: Normal heart sounds. No murmur heard.     No friction rub. No gallop.   Pulmonary:      Effort: Pulmonary effort is normal. No respiratory distress.      Breath sounds: Normal breath sounds. No stridor. No wheezing or rales.   Chest:      Chest wall: No tenderness.   Abdominal:      General: Bowel sounds are normal. There is no distension.      Palpations: Abdomen is soft. There is no mass.      Tenderness: There is no abdominal tenderness. There is no guarding or rebound.   Musculoskeletal:         General: Normal range of motion.      Cervical back: Normal range of motion and neck supple.   Lymphadenopathy:      Cervical: No cervical adenopathy.   Skin:     General: Skin is warm and dry.      Coloration: Skin is not pale.      Findings: No erythema or rash.   Neurological:      Mental Status: He is alert and oriented to person, place, and time.      Cranial Nerves: No cranial nerve deficit.      Motor: No abnormal muscle tone.      Coordination: Coordination normal.      Deep Tendon Reflexes: Reflexes are normal and symmetric. Reflexes normal.             Depression Screening and Follow-up Plan: Patient was screened for depression during today's encounter. They screened  negative with a PHQ-2 score of 0.

## 2024-01-30 NOTE — ASSESSMENT & PLAN NOTE
The patient will go for the up-to-date lipid panel as ordered and we will follow-up with the results.  He will continue to work on improving his diet and exercise.  We will see him back in the office as scheduled.

## 2024-01-30 NOTE — ASSESSMENT & PLAN NOTE
The patient has evidence of iron deficiency anemia on his recent lab work performed by his rheumatologist.  He already has an appointment scheduled with [facility] for further evaluation of this to rule out GI losses.  We will send him for the additional testing as ordered and we will follow-up with the results.

## 2024-02-03 ENCOUNTER — APPOINTMENT (OUTPATIENT)
Dept: LAB | Facility: CLINIC | Age: 59
End: 2024-02-03

## 2024-02-03 DIAGNOSIS — Z13.6 SCREENING FOR CARDIOVASCULAR CONDITION: ICD-10-CM

## 2024-02-03 DIAGNOSIS — D50.9 IRON DEFICIENCY ANEMIA, UNSPECIFIED IRON DEFICIENCY ANEMIA TYPE: ICD-10-CM

## 2024-02-03 DIAGNOSIS — Z13.29 SCREENING FOR ENDOCRINE, METABOLIC AND IMMUNITY DISORDER: ICD-10-CM

## 2024-02-03 DIAGNOSIS — Z13.0 SCREENING FOR ENDOCRINE, METABOLIC AND IMMUNITY DISORDER: ICD-10-CM

## 2024-02-03 DIAGNOSIS — Z12.5 SCREENING FOR PROSTATE CANCER: ICD-10-CM

## 2024-02-03 DIAGNOSIS — E78.5 DYSLIPIDEMIA, GOAL LDL BELOW 130: ICD-10-CM

## 2024-02-03 DIAGNOSIS — Z13.228 SCREENING FOR ENDOCRINE, METABOLIC AND IMMUNITY DISORDER: ICD-10-CM

## 2024-02-03 LAB
CHOLEST SERPL-MCNC: 223 MG/DL
FERRITIN SERPL-MCNC: 10 NG/ML (ref 24–336)
HDLC SERPL-MCNC: 49 MG/DL
IRON SATN MFR SERPL: 29 % (ref 15–50)
IRON SERPL-MCNC: 139 UG/DL (ref 50–212)
LDLC SERPL CALC-MCNC: 143 MG/DL (ref 0–100)
NONHDLC SERPL-MCNC: 174 MG/DL
PSA SERPL-MCNC: 0.35 NG/ML (ref 0–4)
TIBC SERPL-MCNC: 481 UG/DL (ref 250–450)
TRIGL SERPL-MCNC: 156 MG/DL
TSH SERPL DL<=0.05 MIU/L-ACNC: 2.58 UIU/ML (ref 0.45–4.5)
UIBC SERPL-MCNC: 342 UG/DL (ref 155–355)

## 2024-02-03 PROCEDURE — 83540 ASSAY OF IRON: CPT

## 2024-02-03 PROCEDURE — 84443 ASSAY THYROID STIM HORMONE: CPT

## 2024-02-03 PROCEDURE — 82728 ASSAY OF FERRITIN: CPT

## 2024-02-03 PROCEDURE — 83550 IRON BINDING TEST: CPT

## 2024-02-03 PROCEDURE — 36415 COLL VENOUS BLD VENIPUNCTURE: CPT

## 2024-02-03 PROCEDURE — G0103 PSA SCREENING: HCPCS

## 2024-02-03 PROCEDURE — 80061 LIPID PANEL: CPT

## 2024-02-07 ENCOUNTER — TELEPHONE (OUTPATIENT)
Age: 59
End: 2024-02-07

## 2024-02-07 ENCOUNTER — OFFICE VISIT (OUTPATIENT)
Age: 59
End: 2024-02-07

## 2024-02-07 VITALS
BODY MASS INDEX: 35.05 KG/M2 | HEIGHT: 63 IN | SYSTOLIC BLOOD PRESSURE: 124 MMHG | DIASTOLIC BLOOD PRESSURE: 66 MMHG | WEIGHT: 197.8 LBS

## 2024-02-07 DIAGNOSIS — D50.9 IRON DEFICIENCY ANEMIA, UNSPECIFIED IRON DEFICIENCY ANEMIA TYPE: Primary | ICD-10-CM

## 2024-02-07 DIAGNOSIS — Z79.1 NSAID LONG-TERM USE: ICD-10-CM

## 2024-02-07 DIAGNOSIS — Z86.010 PERSONAL HISTORY OF COLONIC POLYPS: ICD-10-CM

## 2024-02-07 PROCEDURE — 99214 OFFICE O/P EST MOD 30 MIN: CPT | Performed by: INTERNAL MEDICINE

## 2024-02-07 RX ORDER — POLYETHYLENE GLYCOL 3350, SODIUM SULFATE ANHYDROUS, SODIUM BICARBONATE, SODIUM CHLORIDE, POTASSIUM CHLORIDE 236; 22.74; 6.74; 5.86; 2.97 G/4L; G/4L; G/4L; G/4L; G/4L
4000 POWDER, FOR SOLUTION ORAL ONCE
Qty: 4000 ML | Refills: 0 | Status: SHIPPED | OUTPATIENT
Start: 2024-02-07 | End: 2024-02-07

## 2024-02-07 NOTE — PROGRESS NOTES
[facility] - Outpatient Consultation  Faustino Gallagher 58 y.o. male MRN: [id number]  Encounter: [id number]    ASSESSMENT AND PLAN:      1. Iron deficiency anemia, unspecified iron deficiency anemia type  Concerning for peptic ulcer disease in the setting of chronic NSAID use.  Unfortunately he does not have insurance, and performing upper endoscopy and colonoscopy with anesthesia would be significant out-of-pocket expense.  He would like to wait until he gets insurance through his employer in a couple months time to undergo the procedures.  Procedure risks and preparation discussed in detail  - Colonoscopy; Future  - EGD; Future  - polyethylene glycol (Golytely) 4000 mL solution; Take 4,000 mL by mouth once for 1 dose Take 4000 mL by mouth once for 1 dose. Use as directed  Dispense: 4000 mL; Refill: 0    2. NSAID long-term use  Until we can get to the procedures, I instructed him to  over-the-counter omeprazole to take daily    3. Personal history of colonic polyps  It sounds like he is due for colonoscopy anyway due to personal history of polyps, and I will proceed as above      Follow up Appointment: For upper endoscopy and colonoscopy    _______________________      Chief Complaint   Patient presents with    GI Consult     Pt experiences flares of upper abdominal pain off/on. He will take omeprazole for 14 day and symptoms resolve. Pt rheumatologist recommended GI consult.        HPI:   Faustino Gallagher is a 58 y.o. year old male with a PMH significant for osteoarthritis on chronic NSAIDs who presents from a consultation from PCP for newly diagnosed iron deficiency anemia.  He has had a colonoscopy about 5 years ago, and he was told that he had polyps.  He was not sure when he supposed to repeat it.  He does occasionally get epigastric pain, for which he picks up over-the-counter omeprazole, and it goes away.  He usually takes it for about 14 days.  He denies  "melena, hematochezia, and change in bowel habits.  He denies family history of colon cancer, gastric cancer, esophageal cancer, and celiac disease.  Unfortunately at this time he has no insurance.    Historical Information   History reviewed. No pertinent past medical history.  Past Surgical History:   Procedure Laterality Date    APPENDECTOMY  1991    COLONOSCOPY  2019    [facility]    DENTAL SURGERY      Tooth extraction     Social History     Substance and Sexual Activity   Alcohol Use Never     Social History     Substance and Sexual Activity   Drug Use Never     Social History     Tobacco Use   Smoking Status Never    Passive exposure: Never   Smokeless Tobacco Never     Family History   Problem Relation Age of Onset    No Known Problems Mother     No Known Problems Father     No Known Problems Sister     No Known Problems Brother        Meds/Allergies     Current Outpatient Medications:     glucosamine-chondroitin 500-400 MG tablet    meloxicam (MOBIC) 15 mg tablet    polyethylene glycol (Golytely) 4000 mL solution    No Known Allergies    PHYSICAL EXAM:    Blood pressure 124/66, height 5' 3\" (1.6 m), weight 89.7 kg (197 lb 12.8 oz). Body mass index is 35.04 kg/m².  General Appearance: NAD, cooperative, alert  Eyes: Anicteric  GI:  Soft, non-tender, non-distended; normal bowel sounds; no masses, no organomegaly   Rectal: Deferred  Musculoskeletal: No edema.  Skin:  No jaundice    Lab Results:   Lab Results   Component Value Date    WBC 5.95 01/12/2024    WBC 5.7 03/26/2022    WBC 8.4 06/05/2021    HGB 11.6 (L) 01/12/2024    HGB 15.2 03/26/2022    HGB 13.5 06/05/2021    MCV 70 (L) 01/12/2024     01/12/2024     03/26/2022     06/05/2021    INR 1.0 03/26/2022     Lab Results   Component Value Date    K 4.3 01/12/2024     01/12/2024    CO2 25 01/12/2024    BUN 17 01/12/2024    CREATININE 0.93 01/12/2024    GLUF 90 01/12/2024    CALCIUM 9.4 01/12/2024    AST 21 01/12/2024    AST " "17 03/26/2022    AST 15 06/05/2021    ALT 14 01/12/2024    ALT 16 03/26/2022    ALT 8 06/05/2021    ALKPHOS 69 01/12/2024    ALKPHOS 76 06/28/2019    ALKPHOS 67 04/21/2018    EGFR 91 01/12/2024     Lab Results   Component Value Date    IRON 139 02/03/2024    TIBC 481 (H) 02/03/2024    FERRITIN 10 (L) 02/03/2024     No results found for: \"LIPASE\"    Radiology Results:   No results found.  "

## 2024-02-07 NOTE — TELEPHONE ENCOUNTER
Scheduled date of combo (as of today):6/6/24  Physician performing combo:CH  Location of combo:[facility]  Bowel prep reviewed with patient:Yoli  Instructions reviewed with patient by:Maryann  Clearances: N

## 2024-02-13 ENCOUNTER — PATIENT MESSAGE (OUTPATIENT)
Dept: FAMILY MEDICINE CLINIC | Facility: CLINIC | Age: 59
End: 2024-02-13

## 2024-02-13 DIAGNOSIS — D50.9 IRON DEFICIENCY ANEMIA, UNSPECIFIED IRON DEFICIENCY ANEMIA TYPE: Primary | ICD-10-CM

## 2024-03-07 ENCOUNTER — TELEMEDICINE (OUTPATIENT)
Dept: RHEUMATOLOGY | Facility: CLINIC | Age: 59
End: 2024-03-07

## 2024-03-07 ENCOUNTER — TELEPHONE (OUTPATIENT)
Dept: RHEUMATOLOGY | Facility: CLINIC | Age: 59
End: 2024-03-07

## 2024-03-07 DIAGNOSIS — E61.1 IRON DEFICIENCY: ICD-10-CM

## 2024-03-07 DIAGNOSIS — M17.0 OSTEOARTHRITIS OF BOTH KNEES, UNSPECIFIED OSTEOARTHRITIS TYPE: Primary | ICD-10-CM

## 2024-03-07 DIAGNOSIS — R76.8 POSITIVE ANA (ANTINUCLEAR ANTIBODY): ICD-10-CM

## 2024-03-07 DIAGNOSIS — Z79.1 NSAID LONG-TERM USE: ICD-10-CM

## 2024-03-07 PROCEDURE — 99214 OFFICE O/P EST MOD 30 MIN: CPT | Performed by: INTERNAL MEDICINE

## 2024-03-07 RX ORDER — MELOXICAM 7.5 MG/1
7.5 TABLET ORAL DAILY
Qty: 90 TABLET | Refills: 1 | Status: SHIPPED | OUTPATIENT
Start: 2024-03-07

## 2024-03-07 NOTE — PROGRESS NOTES
Virtual Regular Visit    Verification of patient location:    Patient is located at Home in the following state in which I hold an active license [place]      Assessment/Plan:    Problem List Items Addressed This Visit    None  Visit Diagnoses       Osteoarthritis of both knees, unspecified osteoarthritis type    -  Primary    Relevant Medications    meloxicam (MOBIC) 7.5 mg tablet    Other Relevant Orders    C-reactive protein    Sedimentation rate, automated    NSAID long-term use        Relevant Orders    CBC and differential    Comprehensive metabolic panel    Iron deficiency        Positive ZULEMA (antinuclear antibody)                     Reason for visit is follow up.      Chief Complaint   Patient presents with    Virtual Regular Visit          Encounter provider Maria Fernanda Preciado MD    Provider located at [facility] [address], [facility] [address]  [facility] [id number] [phone]      Recent Visits  No visits were found meeting these conditions.  Showing recent visits within past 7 days and meeting all other requirements  Today's Visits  Date Type Provider Dept   03/07/24 Telemedicine Maria Fernanda Preciado MD  Rheumatology [facility]   Showing today's visits and meeting all other requirements  Future Appointments  No visits were found meeting these conditions.  Showing future appointments within next 150 days and meeting all other requirements       The patient was identified by name and date of birth. Faustino Gallagher was informed that this is a telemedicine visit and that the visit is being conducted through the Epic Embedded platform. He agrees to proceed..  My office door was closed. No one else was in the room.  He acknowledged consent and understanding of privacy and security of the video platform. The patient has agreed to participate and understands they can discontinue the visit at any time.    Patient is aware this is a billable  service.       Subjective    HPI     INITIAL VISIT NOTE (7/2021):  Mr. Gallagher is a 55-year-old male with history significant for bilateral knee osteoarthritis (presumed to be primary in nature), who presents for further evaluation of this.  He is referred by Dr. Jung for a rheumatology consult.       Patient reports over the past 15 years he has experienced recurrent pain and swelling affecting his bilateral knees, primarily on the right side.  He was seen by Rheumatology in [place] for these symptoms a few years ago and had a thorough rheumatological workup done which was unrevealing.  His presentation was thought to be consistent with primary osteoarthritis.  He has received intra-articular cortisone injections 1-2 times and this has helped him significantly.  No recent injections.  He has also had multiple arthrocentesis performed which apparently have been unrevealing.  His most recent one was done in April 2019 and showed a synovial fluid WBC count of 2464 with a negative synovial fluid crystal and Lyme PCR analysis.  There was a note mentioning extensive inflammation and reactive changes were present.  He has not had knee x-rays done in a few years but apparently this has showed tricompartmental narrowing with joint effusions.  He has not had an MRI of his knees.     He reports over the past 15 years his symptoms have progressively worsened where he experiences some degree of pain and swelling on a daily and constant basis, but states that the swelling can flare-up.  He reports over the past 15 years he has also had intermittent pain that will affect his bilateral hands (in the PIP and MCP joints), wrists, ankles and feet.  These episodes are usually short-lived and infrequent.  He denies any joint pains in his elbows, shoulders, low back or hips.  Other than the knee swelling he denies other joint swelling.  He experiences morning stiffness which affects his knees and can take 2-3 hours to improve.  He  has tried naproxen in the past which has provided him with mild relief which is short-lived.  He has been on diclofenac 75 mg twice a day as needed and states that this does not really help.  He has not been prescribed repeat courses of steroids in the past.  He is currently on a methylprednisolone Dosepak that was started on July 10th after he was seen in the urgent care for a skin rash that he has been experiencing on his knees for the past 5 days.  He reports with his current steroid use this has significantly helped with the bilateral knee pain and swelling and he feels like the skin rash is also clearing up.  He reports that the skin rash has occurred for the first time and is not a recurrent issue.     He mentions over the past 2-3 months he has been experiencing fevers up to 102° F which will last 24 hours and occurs every 7-10 days.  He has not seen his primary care physician for this.  He denies night sweats, unintentional weight loss, inflammatory eye disease (he does experience chronic dry eyes and occasional dry mouth), other types of skin rash, psoriasis, mouth/nose ulcers (can occasionally experience a canker sore related to increased stress), swollen glands, pleuritic chest pain, inflammatory bowel disease, blood clots or a family history of autoimmune disease.  He denies any other medical conditions or symptoms and reports that he is otherwise healthy.        9/3/2021:  Patient presents for a follow up visit today.  The labs showed a positive ZULEMA 1:160 dense fine speckled pattern.  A C reactive protein was slightly elevated at 15.  A ferritin, CK, ESR, uric acid, HLA B27 antigen, anti CCP antibody, rheumatoid factor, Sjogren's antibodies and hepatitis panel were unremarkable.  X-rays of the bilateral knees showed osteoarthritis with small joint effusions.     After the last office visit we discussed discontinuing the diclofenac as it was not helping with his symptoms.  I replaced this with meloxicam  15 mg once a day as needed and he states that this has been helping him with his overall joint pains, but he still experiences pain in his knees as well as at the heels of his bilateral feet, which is more prominent first thing in the morning with the first few steps and then gradually improves.  No other significant joint pains at this time.  He still notices swelling of his knees, more prominently on the right side.  He states that taking meloxicam in combination with the multivitamin seems to be helping him as well.  Since starting the meloxicam he has not had any fevers.  No other complaints today.        3/4/2022:  Patient presents for a follow-up of bilateral knee pain.  He is currently on meloxicam 15 mg once daily.  He did not have a chance to do the ZULEMA related blood work after the last office visit.  I did review the MRI of his right knee which showed:     Longitudinal tear of the medial meniscus.     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  Grade 3 patellofemoral chondrosis.     Moderate joint effusion with lipoma arborescens.  This is nonspecific, but not necessarily indicative of inflammatory arthritis.  In this patient, it is more likely related to osteoarthritis.     No bony erosive changes are seen.  There is no tenosynovitis of the knee.     He reports overall with the meloxicam he has been doing very well and denies any knee pain, swelling or stiffness.  No additional joint complaints.  He is also taking Osteo Bi-Flex supplements which he thinks helps.        3/3/2023:  Patient presents for a follow-up of bilateral knee osteoarthritis.  He is currently on meloxicam 15 mg once a day as needed.  I reviewed his labs done after the last office visit which showed an unremarkable CBC, CMP, ZULEMA specificity, antiphospholipid antibody testing, C3, C4, urine analysis and urine protein creatinine ratio.     He reports he has overall done well over the past year with taking meloxicam 15  mg once a day as needed as well as Osteo Bi-Flex.  No concerning joint pains, swelling or stiffness.  He reports if he tries to miss the meloxicam for a few days he will notice a change in his symptoms.     His only other complaint is experiencing abdominal pain which improves after he eats.  He has also been taking Tums as needed which helps.  No vomiting, blood in stools or dark stools.       3/7/2024:  Patient presents for a follow-up of bilateral knee osteoarthritis.  He is currently on meloxicam 15 mg once every other day.  I reviewed his recent labs which shows a normal ESR, CRP and CMP.  A CBC showed anemia with a hemoglobin of 11.6 and ferritin of 10.  He will be undergoing bidirectional scopes in June and through his primary care physician was started on daily iron supplements.    He reports over the past year he has been fairly stable and has not had any worsening in his joint pains, swelling or stiffness.  The bilateral knee pain is still present but manageable with meloxicam 15 mg once every other day.  At baseline the knee pain is 3-4/10 in intensity but this improves after taking the meloxicam.  The Osteo Bi-Flex has also helped him.    He was seen by gastroenterology in view of the abdominal pain and acid reflux.  As mentioned the plan is to proceed with bidirectional scopes in June.  He will take short courses of the omeprazole as needed which helps with the symptoms.  Currently he does not have any of these complaints.      No past medical history on file.      Past Surgical History:   Procedure Laterality Date    APPENDECTOMY  1991    COLONOSCOPY  2019    [facility]    DENTAL SURGERY      Tooth extraction       Current Outpatient Medications   Medication Sig Dispense Refill    meloxicam (MOBIC) 7.5 mg tablet Take 1 tablet (7.5 mg total) by mouth daily 90 tablet 1    glucosamine-chondroitin 500-400 MG tablet Take 1 tablet by mouth daily      Iron, Ferrous Sulfate, 325 (65 Fe) MG TABS Take  325 mg by mouth 2 (two) times a day 60 tablet 5    polyethylene glycol (Golytely) 4000 mL solution Take 4,000 mL by mouth once for 1 dose Take 4000 mL by mouth once for 1 dose. Use as directed 4000 mL 0     No current facility-administered medications for this visit.        No Known Allergies      Review of Systems  Constitutional: Negative for weight change, fevers, chills, night sweats, fatigue.  ENT/Mouth: Negative for hearing changes, ear pain, nasal congestion, sinus pain, hoarseness, sore throat, rhinorrhea, swallowing difficulty.   Eyes: Negative for pain, redness, discharge, vision changes.   Cardiovascular: Negative for chest pain, SOB, palpitations.   Respiratory: Negative for cough, sputum, wheezing, dyspnea.   Gastrointestinal: Negative for nausea, vomiting, diarrhea, constipation, pain, heartburn.  Genitourinary: Negative for dysuria, urinary frequency, hematuria.   Musculoskeletal: As per HPI.  Skin: Negative for skin rash, color changes.   Neuro: Negative for weakness, numbness, tingling, loss of consciousness.   Psych: Negative for anxiety, depression.   Heme/Lymph: Negative for easy bruising, bleeding, lymphadenopathy.        Video Exam    There were no vitals filed for this visit.    Physical Exam   General: Well appearing, well nourished, in no distress. Oriented x 3, normal mood and affect.  Skin: Good turgor, no rash, unusual bruising or prominent lesions.  HEENT:  Head: Normocephalic, atraumatic.  Eyes: Conjunctiva clear, sclera non-icteric, EOM intact.  Nose: No external lesions.  Neck: Supple.  Neurologic: Alert and oriented. No focal neurological deficits appreciated.   Psychiatric: Normal mood and affect.       Assessment and plan:  Mr. Gallagher is a 58-year-old male with history significant for bilateral knee osteoarthritis (presumed to be primary in nature) who presents for a follow-up.  He is currently on meloxicam 15 mg once every other day.     Kate Fredi presents today for a follow-up of  bilateral knee pain which has been ongoing since at least 2007 with an evaluation being done to determine if this is secondary to primary osteoarthritis versus an inflammatory arthritis.  Although he does describe inflammatory symptoms such as recurrent episodes of swelling and morning stiffness of the knees along with an excellent response to oral steroids, his serological workup has been fairly unrevealing and an MRI of his right knee done in October 2021 was more suggestive of primary osteoarthritis.  I will continue to manage him for this.     - Since the last office visit he has been taking meloxicam 15 mg once every other day as well as Osteo Bi-Flex which significantly helps with his symptoms.  As he does experience a slight increase in pain on the days he is not taking the meloxicam I requested he discontinue this dosing and I will prescribe him meloxicam 7.5 mg once daily.  He will update high risk medication lab monitoring to assess for drug toxicities prior to the follow-up visit.    - In regards to the intermittent abdominal pain and acid reflux as well as iron deficiency anemia I agree with him undergoing bidirectional scopes which is planned for June.  I requested he keep me updated with the results.       Visit Time  Total Visit Duration: 12 minutes.

## 2024-05-15 ENCOUNTER — TELEPHONE (OUTPATIENT)
Age: 59
End: 2024-05-15

## 2024-05-15 NOTE — TELEPHONE ENCOUNTER
Rescheduled    Scheduled date of colonoscopy (as of today): 10-  Physician performing colonoscopy: Dr. Hdz   Location of colonoscopy: [facility]   Bowel prep reviewed with patient: Golytely  Instructions reviewed with patient by: AFSHIN  Clearances: N/A

## 2024-10-04 ENCOUNTER — ANESTHESIA (OUTPATIENT)
Dept: ANESTHESIOLOGY | Facility: AMBULATORY SURGERY CENTER | Age: 59
End: 2024-10-04

## 2024-10-04 ENCOUNTER — ANESTHESIA EVENT (OUTPATIENT)
Dept: ANESTHESIOLOGY | Facility: AMBULATORY SURGERY CENTER | Age: 59
End: 2024-10-04

## 2024-10-07 ENCOUNTER — TELEPHONE (OUTPATIENT)
Age: 59
End: 2024-10-07

## 2024-10-07 NOTE — LETTER
Rashmi Rai,      Attached are your instructions for your upcoming procedure on 12/20/24.  If you have any questions or concerns, please call us at [phone].      Thank you,     [facility]        COLONOSCOPY  MIRALAX/Dulcolax Bowel Preparation Instructions    The OR/GI Lab will contact you the evening prior to your procedure with your exact arrival time.    Our practice requires a 1 week notice for any cancellations or rescheduling. We kindly ask that you immediately notify us of any changes including any new medications that are prescribed. Thank you for your cooperation.     WEEK BEFORE YOUR PROCEDURE:  Stop taking Iron tablets.  5 days prior, AVOID vegetables and fruits with skins or seeds, nuts, corn, popcorn and whole grain breads.   Purchase: One (1) 238-gram container of Miralax (polyethylene glycol 3350), four (4) 5 mg Dulcolax (bisacodyl) tablets, and one (1) 64-ounce bottle of Gatorade (sports drink) - no red, orange, or purple. These may be purchased at any pharmacy without a prescription. Generic products are permissible.   Arrange responsible transportation for day of the procedure.     DAY BEFORE THE PROCEDURE:   CLEAR liquids only for entire day prior. Nothing red, orange or purple.    You MAY have:                                                               Soda  Water  Broth Gatorade  Jello  Popsicles Coffee/tea without milk/creamer     YOU MAY NOT HAVE:  Solid foods   Milk and milk products    Juice with pulp    BOWEL PREPARATION:  Includes: One (1) 238-gram container of Miralax (polyethylene glycol 3350), four (4) 5 mg Dulcolax (bisacodyl) tablets, and one (1) 64-ounce bottle of Gatorade (sports drink).  Preparation may be refrigerated.  Entire bowel prep should be completed.     Afternoon before the procedure (2:00 pm - 5:00 pm):    Take two (2) 5 mg Dulcolax laxative tablets.     Evening before the procedure (6:00 pm):  Mix entire container of  Miralax with one (1) 64-ounce bottle of Gatorade and shake until all medication is dissolved.   Begin drinking solution. Drink an eight (8) ounce cup every 10-15 minutes until you have consumed half (32 ounces) of the solution.  Refrigerate remaining solution.    Night before the procedure (8:00 pm):  Take two (2) 5 mg Dulcolax laxative tablets.     Beginning 5 hours before your procedure:  Drink the remaining amount of prepared solution (32 ounces).  Drink an eight (8) ounce cup every 10-15 minutes until you have consumed the remaining solution.     Bowel prep should be completed 4 hours prior to procedure time.    NOTHING TO EAT OR DRINK AFTER MIDNIGHT- EXCEPT FOR YOUR PREP    DAY OF THE PROCEDURE:  You may brush your teeth.  Leave all jewelry at home.  Please arrive for your procedure as indicated by the OR / GI Lab / Endoscopy Unit. The hospital will contact you the day before with your exact arrival time.   Make sure you have arranged ahead of time for a responsible adult (18 or older) to accompany and drive you home after the procedure.  Please discuss any transportation concerns with our staff prior to your procedure.    The effects of the anesthesia can persist for 24 hours.  After receiving the sedation, you must exercise caution before engaging in any activity that could harm yourself and others (such as driving a car).  Do not make any important decisions or do not drink any alcoholic beverages during this time period.  After your procedure, you may have anything you'd like to eat or drink.  You will probably want to start with something light.  Please include plenty of fluids.  Avoid items that cause gas such as sodas and salads.    SPECIAL INSTRUCTIONS:    For patients currently taking blood thinners and/or antiplatelet therapy our office will contact the prescribing provider.  Our office will contact you with any required changes to your medication regimen.     Blood thinner (i.e. - Coumadin, Pradaxa,  Lovenox, Xarelto, Eliquis)  ?  Continue (Do Not Stop)  ? Stop______________for_____________days prior to the procedure.    Antiplatelet (i.e. - Plavix, Aggrenox, Effient, Brilinta)  ?  Continue (Do Not Stop)  ? Stop______________for_____________days prior to the procedure.       Diabetes:   If you are Diabetic, please see separate Diabetic Instruction Sheet.          Prescribed medications:  Do not stop your aspirin, or any of your other medications (unless instructed otherwise).    Take the rest of your prescribed medications with small sips of water at least 2 hours prior to your procedure.    Medicine Instructions for Adults with Diabetes who Need a Bowel Prep       Follow these instructions when a BOWEL PREP is required for your procedure or surgery!    NOTE:   GLP-1 Agonists taken weekly: do not take in the 7 days before your procedure   SGLT-2 Inhibitors: do not take in the 4 days before your procedure     On the Day Before Surgery/Procedure  If you are having a procedure (e.g. Colonoscopy) or surgery that requires a bowel prep and you may have at least a clear liquid diet, follow the directions below based on the type of medicine you take for your diabetes.     Type of Medicine You Take Examples What to do   Pre-Mixed Insulin - Intermediate Acting Humalog® 75/25, Humulin® 70/30, Novolog® 70/30, Regular Insulin Take ½ your regular dose the evening before your procedure   Rapid/Fast Acting Insulin Humalog® U200, NovoLog®, Apidra®, Fiasp® Take ½ your regular dose the evening before your procedure.   Long-Acting Insulin Lantus®, Levemir®, Tresiba®, Toujeo®, Basaglar® Take your FULL regular dose the day before procedure   Oral Sulfonylurea Glipizide/Glimepiride/Glucotrol® Take ½ your regular dose the evening before your procedure   Other Oral Diabetes Medicines Metformin®, Glucophage®, Glucophage XR®, Riomet®, Glumetza®), Actose®, Avandia®, Glyset®, Prandin® Take your regular dose with dinner in the evening before  your procedure   GLP-1 Agonists AdlyxinÒ, ByettaÒ, BydureonÒ, OzempicÒ, SoliquaÒ, TanzeumÒ, TrulicityÒ, VictozaÒ, Saxenda®, Rybelsus® If taken daily, take as normal    If taken weekly, do not take this medicine for 7 days before your procedure including the day of the procedure (resume taking after the procedure)   SGLT-2 Inhibitors Jardiance®, Invokana®, Farxiga®,   Steglatro®, Brenzavvy®, Qtern®, Segluromet®, Glyxambi®, Synjardy®, Synjardy XR®, Invokamet®, Invokamet XR®, Trijary XR®, Xigduo XR®, Steglujan® Do not take for 4 days before your procedure including the day of the procedure (resume taking after the procedure)                More information continued on back                    Medicine Instructions for Adults with Diabetes who Need a Bowel Prep  Page 2      On the Day of Surgery/Procedure  Follow the directions below based on the type of medicine you take for your diabetes.     Type of Medicine You Take Examples What to do   Long-Acting Insulin Lantus®, Levemir®, Tresiba®, Toujeo®, Basaglar®, Semglee®   If you usually take your Long-Acting Insulin in the morning, take the full dose as scheduled.   GLP-1 Agonists AdlyxinÒ, ByettaÒ, BydureonÒ, OzempicÒ, SoliquaÒ, TanzeumÒ, TrulicityÒ, VictozaÒ, Saxenda®, Rybelsus® Do NOT take this medicine on the day of your procedure (resume taking after the procedure)       On the Day of Surgery/Procedure (continued)  Except for the morning Long-Acting Insulin, DO NOT take ANY diabetic medicine on the day of your procedure unless you were instructed by the doctor who manages your diabetes medicines.    Continue to check your blood sugars.  If you have an insulin pump, ask your endocrinologist for instructions at least 3 days before your procedure. NOTE: If you are not able to ask your endocrinologist in advance, on the day of the procedure set your insulin pump to your basal rate only. Bring your insulin pump supplies to the hospital.     If you have any questions about  taking your diabetes medicines prior to your procedure, please contact the doctor who manages your diabetes medicines.   For any questions or concerns related to your bowel preparation or pre-procedure instructions, please contact our office at [phone].  Thank you for choosing [facility]!

## 2024-10-07 NOTE — LETTER
Rashmi Rai,    Please use this Golytely prep instead. Please disregard previous MyChart message.     Thank you,    [facility]    ? [facility] - [address], [place]  [address]  ? [facility] - [address], [place]  [address]  ? [facility] - [address], [place]  [address]  ? [facility] - [address] , [place] [address] - [address]ioleta [address] - Entrance A - 1st FL  ? [facility] - [address] [address]  ? [facility] - [address] [place] [address]  ? [facility] - [address] [address]  ? [facility] - [address] [place] [address]  ? [facility] - [address] [place] [address]  ? [facility] - [address] [place] [address]  ? [facility] - [address], [place] [address]  ? [facility] - [address] [address]  ? [facility] - [address] [place] [address]  ? [facility] - [address] [place]  [address]  ? [facility] - [address], [place] [address] - Entrance C    DATE OF PROCEDURE:  __________________   TIME OF PROCEDURE: _____________      The OR/GI Lab will contact you the evening prior to your procedure with your exact arrival time.    We kindly ask that you immediately notify us of any need to cancel or reschedule your procedure.      WEEK BEFORE YOUR PROCEDURE:  Do not take Iron tablets for one week.  5 days prior to the appointment AVOID vegetables and fruits with skins or seeds, nuts, corn, popcorn and whole grain breads.   : your bowel prep from your pharmacy.  A prescription has been sent electronically.   Purchase: (2) 5 mg Dulcolax (bisacodyl) tablets - over the counter.  Arrange responsible transportation for day of the procedure.   DAY BEFORE THE PROCEDURE:   CLEAR liquids only for entire day prior.  Nothing red, orange or purple.  You MAY have:  Soda  Water  Broth Gatorade  Jell-O  Popsicles Coffee/tea without milk/creamer     YOU MAY NOT HAVE:  Solid foods   Milk and milk products    Juice with pulp      Bowel Preparation  The morning before your procedure you may mix the solution according to the package instructions and refrigerate until you are ready to start drinking it later in the day.  Entire bowel prep should be completed.   Evening before your procedure (5:00 pm - 6:00 pm):  Take (2) 5 mg Dulcolax laxative tablets.    Drink ½ of the premixed prep solution (64 ounces/2 liters) by drinking (1) 8-ounce cup every 10-15 minutes until the container is ½ completed (64 ounces/2 liters).    Beginning 5 hours before your procedure:    Drink the remaining premixed bowel prep solution.   Bowel prep should be completed 4 hours prior to your procedure time.    NOTHING TO EAT OR DRINK AFTER MIDNIGHT -EXCEPT YOUR BOWEL PREP    ________________________________________________________________________  DAY OF THE PROCEDURE:  You may brush your teeth.  Leave all jewelry at home.  Remove any facial piercings, if able.   Please arrive for your procedure as indicated by the OR / GI Lab / Endoscopy Unit. The hospital will contact you the day before with your exact arrival time.   Make sure you have arranged ahead of time for a responsible adult (18 or older) to accompany and drive you home after the procedure.  Please discuss any transportation concerns with our staff prior to your procedure.    The effects of the anesthesia can persist for 24 hours.  After receiving the sedation, you must exercise caution before engaging in any activity that could harm yourself and others (such as driving a car).  Do not make any important decisions or do not drink any alcoholic beverages during this time period.  After your procedure, you may have anything you'd like to eat or drink.  You will probably want to start with something light.   Please include plenty of fluids.  Avoid items that cause gas such as sodas and salads.      SPECIAL INSTRUCTIONS:    For patients currently taking blood thinners and/or antiplatelet therapy our office will contact the prescribing provider.  Our office will contact you with any required changes to your medication regimen.       Blood thinner (i.e. - Coumadin, Pradaxa, Lovenox, Xarelto, Eliquis)  ?  Continue (Do Not Stop)  ? Stop______________for_____________days prior to the procedure.    Antiplatelet (i.e. - Plavix, Aggrenox, Effient, Brilinta)  ?  Continue (Do Not Stop)  ? Stop______________for_____________days prior to the procedure.    Diabetes:   If you are Diabetic, please see separate Diabetic Instruction Sheet.          Prescribed medications:  Do not stop your aspirin, or any of your other medications (unless instructed otherwise).  Take the rest of your prescribed medications with small sips of water at least 2 hours prior to your appointment.   Medicine Instructions for Adults with Diabetes who Need a Bowel Prep       Follow these instructions when a BOWEL PREP is required for your procedure or surgery!    NOTE:   GLP-1 Agonists taken weekly: do not take in the 7 days before your procedure   SGLT-2 Inhibitors: do not take in the 4 days before your procedure     On the Day Before Surgery/Procedure  If you are having a procedure (e.g. Colonoscopy) or surgery that requires a bowel prep and you may have at least a clear liquid diet, follow the directions below based on the type of medicine you take for your diabetes.     Type of Medicine You Take Examples What to do   Pre-Mixed Insulin - Intermediate Acting Humalog® 75/25, Humulin® 70/30, Novolog® 70/30, Regular Insulin Take ½ your regular dose the evening before your procedure   Rapid/Fast Acting Insulin Humalog® U200, NovoLog®, Apidra®, Fiasp® Take ½ your regular dose the evening before your procedure.   Long-Acting Insulin Lantus®, Levemir®, Tresiba®,  Toujeo®, Basaglar® Take your FULL regular dose the day before procedure   Oral Sulfonylurea Glipizide/Glimepiride/Glucotrol® Take ½ your regular dose the evening before your procedure   Other Oral Diabetes Medicines Metformin®, Glucophage®, Glucophage XR®, Riomet®, Glumetza®), Actose®, Avandia®, Glyset®, Prandin® Take your regular dose with dinner in the evening before your procedure   GLP-1 Agonists AdlyxinÒ, ByettaÒ, BydureonÒ, OzempicÒ, SoliquaÒ, TanzeumÒ, TrulicityÒ, VictozaÒ, Saxenda®, Rybelsus® If taken daily, take as normal    If taken weekly, do not take this medicine for 7 days before your procedure including the day of the procedure (resume taking after the procedure)   SGLT-2 Inhibitors Jardiance®, Invokana®, Farxiga®,   Steglatro®, Brenzavvy®, Qtern®, Segluromet®, Glyxambi®, Synjardy®, Synjardy XR®, Invokamet®, Invokamet XR®, Trijary XR®, Xigduo XR®, Steglujan® Do not take for 4 days before your procedure including the day of the procedure (resume taking after the procedure)                More information continued on back                    Medicine Instructions for Adults with Diabetes who Need a Bowel Prep  Page 2      On the Day of Surgery/Procedure  Follow the directions below based on the type of medicine you take for your diabetes.     Type of Medicine You Take Examples What to do   Long-Acting Insulin Lantus®, Levemir®, Tresiba®, Toujeo®, Basaglar®, Semglee®   If you usually take your Long-Acting Insulin in the morning, take the full dose as scheduled.   GLP-1 Agonists AdlyxinÒ, ByettaÒ, BydureonÒ, OzempicÒ, SoliquaÒ, TanzeumÒ, TrulicityÒ, VictozaÒ, Saxenda®, Rybelsus® Do NOT take this medicine on the day of your procedure (resume taking after the procedure)       On the Day of Surgery/Procedure (continued)  Except for the morning Long-Acting Insulin, DO NOT take ANY diabetic medicine on the day of your procedure unless you were instructed by the doctor who manages your diabetes medicines.     Continue to check your blood sugars.  If you have an insulin pump, ask your endocrinologist for instructions at least 3 days before your procedure. NOTE: If you are not able to ask your endocrinologist in advance, on the day of the procedure set your insulin pump to your basal rate only. Bring your insulin pump supplies to the hospital.     If you have any questions about taking your diabetes medicines prior to your procedure, please contact the doctor who manages your diabetes medicines.                                                                 NOTHING TO EAT OR DRINK (INCLUDING CHEWING GUM) AFTER 12 MIDNIGHT PRIOR TO YOUR PROCEDURE.    For any questions or concerns related to your bowel preparation or pre-procedure instructions, please contact our office.  Thank you for choosing [facility]!

## 2024-12-06 ENCOUNTER — ANESTHESIA (OUTPATIENT)
Dept: ANESTHESIOLOGY | Facility: AMBULATORY SURGERY CENTER | Age: 59
End: 2024-12-06

## 2024-12-06 ENCOUNTER — ANESTHESIA EVENT (OUTPATIENT)
Dept: ANESTHESIOLOGY | Facility: AMBULATORY SURGERY CENTER | Age: 59
End: 2024-12-06

## 2024-12-14 ENCOUNTER — APPOINTMENT (OUTPATIENT)
Dept: LAB | Facility: CLINIC | Age: 59
End: 2024-12-14
Payer: COMMERCIAL

## 2024-12-14 DIAGNOSIS — Z79.1 NSAID LONG-TERM USE: ICD-10-CM

## 2024-12-14 DIAGNOSIS — M17.0 OSTEOARTHRITIS OF BOTH KNEES, UNSPECIFIED OSTEOARTHRITIS TYPE: ICD-10-CM

## 2024-12-14 LAB
ALBUMIN SERPL BCG-MCNC: 4.3 G/DL (ref 3.5–5)
ALP SERPL-CCNC: 68 U/L (ref 34–104)
ALT SERPL W P-5'-P-CCNC: 18 U/L (ref 7–52)
ANION GAP SERPL CALCULATED.3IONS-SCNC: 8 MMOL/L (ref 4–13)
AST SERPL W P-5'-P-CCNC: 17 U/L (ref 13–39)
BASOPHILS # BLD AUTO: 0.08 THOUSANDS/ÂΜL (ref 0–0.1)
BASOPHILS NFR BLD AUTO: 1 % (ref 0–1)
BILIRUB SERPL-MCNC: 0.9 MG/DL (ref 0.2–1)
BUN SERPL-MCNC: 19 MG/DL (ref 5–25)
CALCIUM SERPL-MCNC: 8.9 MG/DL (ref 8.4–10.2)
CHLORIDE SERPL-SCNC: 107 MMOL/L (ref 96–108)
CO2 SERPL-SCNC: 27 MMOL/L (ref 21–32)
CREAT SERPL-MCNC: 0.97 MG/DL (ref 0.6–1.3)
CRP SERPL QL: 5.6 MG/L
EOSINOPHIL # BLD AUTO: 0.86 THOUSAND/ÂΜL (ref 0–0.61)
EOSINOPHIL NFR BLD AUTO: 14 % (ref 0–6)
ERYTHROCYTE [DISTWIDTH] IN BLOOD BY AUTOMATED COUNT: 12.9 % (ref 11.6–15.1)
ERYTHROCYTE [SEDIMENTATION RATE] IN BLOOD: 13 MM/HOUR (ref 0–19)
GFR SERPL CREATININE-BSD FRML MDRD: 86 ML/MIN/1.73SQ M
GLUCOSE P FAST SERPL-MCNC: 95 MG/DL (ref 65–99)
HCT VFR BLD AUTO: 45.3 % (ref 36.5–49.3)
HGB BLD-MCNC: 15.7 G/DL (ref 12–17)
IMM GRANULOCYTES # BLD AUTO: 0.02 THOUSAND/UL (ref 0–0.2)
IMM GRANULOCYTES NFR BLD AUTO: 0 % (ref 0–2)
LYMPHOCYTES # BLD AUTO: 2.18 THOUSANDS/ÂΜL (ref 0.6–4.47)
LYMPHOCYTES NFR BLD AUTO: 34 % (ref 14–44)
MCH RBC QN AUTO: 29.2 PG (ref 26.8–34.3)
MCHC RBC AUTO-ENTMCNC: 34.7 G/DL (ref 31.4–37.4)
MCV RBC AUTO: 84 FL (ref 82–98)
MONOCYTES # BLD AUTO: 0.65 THOUSAND/ÂΜL (ref 0.17–1.22)
MONOCYTES NFR BLD AUTO: 10 % (ref 4–12)
NEUTROPHILS # BLD AUTO: 2.54 THOUSANDS/ÂΜL (ref 1.85–7.62)
NEUTS SEG NFR BLD AUTO: 41 % (ref 43–75)
NRBC BLD AUTO-RTO: 0 /100 WBCS
PLATELET # BLD AUTO: 245 THOUSANDS/UL (ref 149–390)
PMV BLD AUTO: 10.1 FL (ref 8.9–12.7)
POTASSIUM SERPL-SCNC: 4.3 MMOL/L (ref 3.5–5.3)
PROT SERPL-MCNC: 6.9 G/DL (ref 6.4–8.4)
RBC # BLD AUTO: 5.38 MILLION/UL (ref 3.88–5.62)
SODIUM SERPL-SCNC: 142 MMOL/L (ref 135–147)
WBC # BLD AUTO: 6.33 THOUSAND/UL (ref 4.31–10.16)

## 2024-12-14 PROCEDURE — 85025 COMPLETE CBC W/AUTO DIFF WBC: CPT

## 2024-12-14 PROCEDURE — 85652 RBC SED RATE AUTOMATED: CPT

## 2024-12-14 PROCEDURE — 86140 C-REACTIVE PROTEIN: CPT

## 2024-12-14 PROCEDURE — 36415 COLL VENOUS BLD VENIPUNCTURE: CPT

## 2024-12-14 PROCEDURE — 80053 COMPREHEN METABOLIC PANEL: CPT

## 2024-12-16 ENCOUNTER — OFFICE VISIT (OUTPATIENT)
Dept: FAMILY MEDICINE CLINIC | Facility: CLINIC | Age: 59
End: 2024-12-16
Payer: COMMERCIAL

## 2024-12-16 VITALS
OXYGEN SATURATION: 97 % | DIASTOLIC BLOOD PRESSURE: 72 MMHG | RESPIRATION RATE: 17 BRPM | SYSTOLIC BLOOD PRESSURE: 144 MMHG | WEIGHT: 202.6 LBS | HEIGHT: 63 IN | BODY MASS INDEX: 35.9 KG/M2 | TEMPERATURE: 97.9 F | HEART RATE: 78 BPM

## 2024-12-16 DIAGNOSIS — M19.041 PRIMARY OSTEOARTHRITIS OF HANDS, BILATERAL: ICD-10-CM

## 2024-12-16 DIAGNOSIS — Z13.6 SCREENING FOR CARDIOVASCULAR CONDITION: ICD-10-CM

## 2024-12-16 DIAGNOSIS — Z23 ENCOUNTER FOR IMMUNIZATION: ICD-10-CM

## 2024-12-16 DIAGNOSIS — M19.042 PRIMARY OSTEOARTHRITIS OF HANDS, BILATERAL: ICD-10-CM

## 2024-12-16 DIAGNOSIS — Z13.29 SCREENING FOR ENDOCRINE, METABOLIC AND IMMUNITY DISORDER: ICD-10-CM

## 2024-12-16 DIAGNOSIS — E78.5 DYSLIPIDEMIA, GOAL LDL BELOW 130: Primary | ICD-10-CM

## 2024-12-16 DIAGNOSIS — Z13.228 SCREENING FOR ENDOCRINE, METABOLIC AND IMMUNITY DISORDER: ICD-10-CM

## 2024-12-16 DIAGNOSIS — Z13.0 SCREENING FOR ENDOCRINE, METABOLIC AND IMMUNITY DISORDER: ICD-10-CM

## 2024-12-16 DIAGNOSIS — Z12.5 SCREENING FOR PROSTATE CANCER: ICD-10-CM

## 2024-12-16 PROCEDURE — 90673 RIV3 VACCINE NO PRESERV IM: CPT

## 2024-12-16 PROCEDURE — 90471 IMMUNIZATION ADMIN: CPT

## 2024-12-16 PROCEDURE — 99214 OFFICE O/P EST MOD 30 MIN: CPT | Performed by: FAMILY MEDICINE

## 2024-12-16 NOTE — ASSESSMENT & PLAN NOTE
Patient will go for the up-to-date lab work as indicated and we will follow-up with the results.  He will continue to work on improving his diet and exercise.  We will follow-up with the results when available.  Orders:  •  LDL cholesterol, direct; Future  •  Lipid panel; Future  •  TSH, 3rd generation with Free T4 reflex; Future  •  UA (URINE) with reflex to Scope; Future

## 2024-12-16 NOTE — PROGRESS NOTES
Name: Faustino Gallagher      : 1965      MRN: [id number]  Encounter Provider: Karina Reno DO  Encounter Date: 2024   Encounter department: [facility]  :  Assessment & Plan  Dyslipidemia, goal LDL below 130  Patient will go for the up-to-date lab work as indicated and we will follow-up with the results.  He will continue to work on improving his diet and exercise.  We will follow-up with the results when available.  Orders:  •  LDL cholesterol, direct; Future  •  Lipid panel; Future  •  TSH, 3rd generation with Free T4 reflex; Future  •  UA (URINE) with reflex to Scope; Future    Primary osteoarthritis of hands, bilateral  The patient is having worsening arthritis pain in both hands.  We will send him for x-rays of both hands if is indicated and we will refer him to our orthopedic hand specialist for further management.  He can try ice periodically and also Tylenol 500 mg 2 tablets 3 times a day no more than 6/day.  Orders:  •  XR hand 3+ vw left; Future  •  XR hand 3+ vw right; Future  •  Ambulatory Referral to Orthopedic Surgery; Future    Encounter for immunization    Orders:  •  influenza vaccine, recombinant, PF, 0.5 mL IM (Flublok)    Screening for cardiovascular condition    Orders:  •  LDL cholesterol, direct; Future  •  Lipid panel; Future  •  TSH, 3rd generation with Free T4 reflex; Future  •  UA (URINE) with reflex to Scope; Future    Screening for endocrine, metabolic and immunity disorder    Orders:  •  LDL cholesterol, direct; Future  •  Lipid panel; Future  •  TSH, 3rd generation with Free T4 reflex; Future  •  UA (URINE) with reflex to Scope; Future    Screening for prostate cancer    Orders:  •  LDL cholesterol, direct; Future  •  Lipid panel; Future  •  TSH, 3rd generation with Free T4 reflex; Future  •  UA (URINE) with reflex to Scope; Future          Depression Screening and Follow-up Plan: Patient was screened for depression during today's  encounter. They screened negative with a PHQ-2 score of 0.      Chief Complaint   Patient presents with   • Follow-up     Check up 3 months        History of Present Illness     Faustino Gallagher is a 59 y.o. male who presents today for a routine checkup of his hyperlipidemia and osteoarthritis.  The patient continues to follow-up with GI for evaluation of his anemia.  He is scheduled for endoscopy later this week.  He states he is feeling well overall.  He continues to try to work on improving his diet and exercise.    He has a colonsocopy and an EGD scheduled this week.    The patient denies any chest pain, shortness of breath, or palpitations.  There is no CASTRO, PND, or orthopnea.  There is no edema.  There are no headaches or visual changes.  There is no lightheadedness, dizziness, or fainting spells.  The patient currently denies any nausea, vomiting, or GERD symptoms.  he has normal bowel movements and normal urine output.  he has a normal appetite.        Hyperlipidemia  This is a chronic problem. The current episode started more than 1 year ago. The problem is controlled. Recent lipid tests were reviewed and are normal. Pertinent negatives include no chest pain, focal sensory loss, focal weakness, leg pain, myalgias or shortness of breath.     Review of Systems   Constitutional: Negative.    HENT: Negative.     Eyes: Negative.    Respiratory: Negative.  Negative for shortness of breath.    Cardiovascular: Negative.  Negative for chest pain.   Gastrointestinal: Negative.    Endocrine: Negative.    Genitourinary: Negative.    Musculoskeletal: Negative.  Negative for myalgias.   Skin: Negative.    Allergic/Immunologic: Negative.    Neurological: Negative.  Negative for focal weakness.   Hematological: Negative.    Psychiatric/Behavioral: Negative.         Objective   /72 (BP Location: Left arm, Patient Position: Sitting, Cuff Size: Large)   Pulse 78   Temp 97.9 °F (36.6 °C) (Tympanic)   Resp 17   Ht  "5' 3\" (1.6 m)   Wt 91.9 kg (202 lb 9.6 oz)   SpO2 97%   BMI 35.89 kg/m²      Physical Exam  Vitals and nursing note reviewed.   Constitutional:       General: He is not in acute distress.     Appearance: Normal appearance. He is well-developed. He is not diaphoretic.   HENT:      Head: Normocephalic and atraumatic.      Right Ear: Tympanic membrane, ear canal and external ear normal.      Left Ear: Tympanic membrane, ear canal and external ear normal.      Nose: No congestion or rhinorrhea.      Mouth/Throat:      Mouth: Mucous membranes are moist.      Pharynx: Oropharynx is clear.   Eyes:      Extraocular Movements: Extraocular movements intact.      Pupils: Pupils are equal, round, and reactive to light.   Neck:      Thyroid: No thyromegaly.      Vascular: No JVD.      Trachea: No tracheal deviation.   Cardiovascular:      Rate and Rhythm: Normal rate and regular rhythm.      Heart sounds: Normal heart sounds. No murmur heard.     No friction rub. No gallop.   Pulmonary:      Effort: Pulmonary effort is normal. No respiratory distress.      Breath sounds: Normal breath sounds. No stridor. No wheezing or rales.   Chest:      Chest wall: No tenderness.   Abdominal:      General: Bowel sounds are normal. There is no distension.      Palpations: Abdomen is soft. There is no mass.      Tenderness: There is no abdominal tenderness. There is no guarding or rebound.   Musculoskeletal:         General: Normal range of motion.      Cervical back: Normal range of motion and neck supple.   Lymphadenopathy:      Cervical: No cervical adenopathy.   Skin:     General: Skin is warm and dry.      Coloration: Skin is not pale.      Findings: No erythema or rash.   Neurological:      Mental Status: He is alert and oriented to person, place, and time.      Cranial Nerves: No cranial nerve deficit.      Motor: No abnormal muscle tone.      Coordination: Coordination normal.      Deep Tendon Reflexes: Reflexes are normal and " symmetric. Reflexes normal.       Administrative Statements   I have spent a total time of 20 minutes in caring for this patient on the day of the visit/encounter including Diagnostic results, Prognosis, Risks and benefits of tx options, Instructions for management, Patient and family education, Importance of tx compliance, Risk factor reductions, Impressions, Counseling / Coordination of care, Documenting in the medical record, Reviewing / ordering tests, medicine, procedures  , and Obtaining or reviewing history  .

## 2024-12-20 ENCOUNTER — HOSPITAL ENCOUNTER (OUTPATIENT)
Dept: GASTROENTEROLOGY | Facility: AMBULATORY SURGERY CENTER | Age: 59
Discharge: HOME | End: 2024-12-20
Attending: INTERNAL MEDICINE
Payer: COMMERCIAL

## 2024-12-20 ENCOUNTER — ANESTHESIA EVENT (OUTPATIENT)
Dept: GASTROENTEROLOGY | Facility: AMBULATORY SURGERY CENTER | Age: 59
End: 2024-12-20

## 2024-12-20 ENCOUNTER — ANESTHESIA (OUTPATIENT)
Dept: GASTROENTEROLOGY | Facility: AMBULATORY SURGERY CENTER | Age: 59
End: 2024-12-20

## 2024-12-20 VITALS
HEART RATE: 94 BPM | SYSTOLIC BLOOD PRESSURE: 114 MMHG | WEIGHT: 202 LBS | OXYGEN SATURATION: 98 % | DIASTOLIC BLOOD PRESSURE: 69 MMHG | TEMPERATURE: 97.4 F | RESPIRATION RATE: 17 BRPM | HEIGHT: 63 IN | BODY MASS INDEX: 35.79 KG/M2

## 2024-12-20 DIAGNOSIS — D50.9 IRON DEFICIENCY ANEMIA, UNSPECIFIED IRON DEFICIENCY ANEMIA TYPE: ICD-10-CM

## 2024-12-20 PROCEDURE — 88341 IMHCHEM/IMCYTCHM EA ADD ANTB: CPT | Performed by: PATHOLOGY

## 2024-12-20 PROCEDURE — 45378 DIAGNOSTIC COLONOSCOPY: CPT | Performed by: INTERNAL MEDICINE

## 2024-12-20 PROCEDURE — 88342 IMHCHEM/IMCYTCHM 1ST ANTB: CPT | Performed by: PATHOLOGY

## 2024-12-20 PROCEDURE — 88305 TISSUE EXAM BY PATHOLOGIST: CPT | Performed by: PATHOLOGY

## 2024-12-20 PROCEDURE — 43239 EGD BIOPSY SINGLE/MULTIPLE: CPT | Performed by: INTERNAL MEDICINE

## 2024-12-20 RX ORDER — LIDOCAINE HYDROCHLORIDE 10 MG/ML
INJECTION, SOLUTION EPIDURAL; INFILTRATION; INTRACAUDAL; PERINEURAL AS NEEDED
Status: DISCONTINUED | OUTPATIENT
Start: 2024-12-20 | End: 2024-12-20

## 2024-12-20 RX ORDER — SODIUM CHLORIDE, SODIUM LACTATE, POTASSIUM CHLORIDE, CALCIUM CHLORIDE 600; 310; 30; 20 MG/100ML; MG/100ML; MG/100ML; MG/100ML
50 INJECTION, SOLUTION INTRAVENOUS CONTINUOUS
Status: DISCONTINUED | OUTPATIENT
Start: 2024-12-20 | End: 2024-12-24 | Stop reason: HOSPADM

## 2024-12-20 RX ORDER — PROPOFOL 10 MG/ML
INJECTION, EMULSION INTRAVENOUS AS NEEDED
Status: DISCONTINUED | OUTPATIENT
Start: 2024-12-20 | End: 2024-12-20

## 2024-12-20 RX ADMIN — PROPOFOL 50 MG: 10 INJECTION, EMULSION INTRAVENOUS at 10:29

## 2024-12-20 RX ADMIN — SODIUM CHLORIDE, SODIUM LACTATE, POTASSIUM CHLORIDE, CALCIUM CHLORIDE 50 ML/HR: 600; 310; 30; 20 INJECTION, SOLUTION INTRAVENOUS at 09:50

## 2024-12-20 RX ADMIN — LIDOCAINE HYDROCHLORIDE 50 MG: 10 INJECTION, SOLUTION EPIDURAL; INFILTRATION; INTRACAUDAL; PERINEURAL at 10:21

## 2024-12-20 RX ADMIN — PROPOFOL 50 MG: 10 INJECTION, EMULSION INTRAVENOUS at 10:33

## 2024-12-20 RX ADMIN — PROPOFOL 50 MG: 10 INJECTION, EMULSION INTRAVENOUS at 10:25

## 2024-12-20 RX ADMIN — PROPOFOL 150 MG: 10 INJECTION, EMULSION INTRAVENOUS at 10:21

## 2024-12-20 NOTE — ANESTHESIA POSTPROCEDURE EVALUATION
Post-Op Assessment Note    CV Status:  Stable  Pain Score: 0    Pain management: adequate       Mental Status:  Alert and awake   Hydration Status:  Euvolemic   PONV Controlled:  None   Airway Patency:  Patent     Post Op Vitals Reviewed: Yes    No anethesia notable event occurred.    Staff: Anesthesiologist, CRNA   Comments: report given malachi RN; VSS; RA          Last Filed PACU Vitals:  Vitals Value Taken Time   Temp     Pulse 86    /60    Resp 18    SpO2 96        Modified Iraj:  Activity: 2 (12/20/2024  9:31 AM)  Respiration: 2 (12/20/2024  9:31 AM)  Circulation: 2 (12/20/2024  9:31 AM)  Consciousness: 2 (12/20/2024  9:31 AM)  Oxygen Saturation: 2 (12/20/2024  9:31 AM)  Modified Iraj Score: 10 (12/20/2024  9:31 AM)

## 2024-12-20 NOTE — ANESTHESIA PREPROCEDURE EVALUATION
Procedure:  COLONOSCOPY  EGD    Relevant Problems   CARDIO   (+) Dyslipidemia, goal LDL below 130      HEMATOLOGY   (+) Iron deficiency anemia      MUSCULOSKELETAL   (+) Primary osteoarthritis of both knees        Physical Exam    Airway    Mallampati score: II  TM Distance: >3 FB  Neck ROM: full     Dental   No notable dental hx     Cardiovascular      Pulmonary      Other Findings        Anesthesia Plan  ASA Score- 2     Anesthesia Type- IV sedation with anesthesia with ASA Monitors.         Additional Monitors:     Airway Plan:            Plan Factors-Exercise tolerance (METS): >4 METS.    Chart reviewed.    Patient summary reviewed.    Patient is not a current smoker.              Induction- intravenous.    Postoperative Plan-         Informed Consent- Anesthetic plan and risks discussed with patient.  I personally reviewed this patient with the CRNA. Discussed and agreed on the Anesthesia Plan with the CRNA..

## 2024-12-20 NOTE — H&P
"History and Physical -  [facility]  Faustino Gallagher 59 y.o. male MRN: [id number]    HPI: Faustino Gallagher is a 59 y.o. male who presents for upper endoscopy and colonoscopy due to anemia.  He had a colonoscopy 5 years ago and had polyps    REVIEW OF SYSTEMS: Per the HPI, and otherwise unremarkable.    Historical Information   History reviewed. No pertinent past medical history.  Past Surgical History:   Procedure Laterality Date    APPENDECTOMY  1991    COLONOSCOPY  2019    [facility]    DENTAL SURGERY      Tooth extraction     Social History   Social History     Substance and Sexual Activity   Alcohol Use Never     Social History     Substance and Sexual Activity   Drug Use Never     Social History     Tobacco Use   Smoking Status Never    Passive exposure: Never   Smokeless Tobacco Never     Family History   Problem Relation Age of Onset    No Known Problems Mother     No Known Problems Father     No Known Problems Sister     No Known Problems Brother        Meds/Allergies       Current Outpatient Medications:     glucosamine-chondroitin 500-400 MG tablet    meloxicam (Mobic) 15 mg tablet    Current Facility-Administered Medications:     lactated ringers infusion, 50 mL/hr, Intravenous, Continuous, 50 mL/hr at 12/20/24 0950    No Known Allergies    Objective     /85   Pulse 99   Temp (!) 97.4 °F (36.3 °C) (Temporal)   Resp 20   Ht 5' 3\" (1.6 m)   Wt 91.6 kg (202 lb)   SpO2 100%   BMI 35.78 kg/m²     PHYSICAL EXAM    General Appearance: NAD, cooperative, alert  Eyes: Anicteric  GI:  Soft, non-tender, non-distended; normal bowel sounds; no masses, no organomegaly   Rectal: Deferred until procedure  Musculoskeletal: No edema.  Skin:  No jaundice    ASSESSMENT/PLAN:  This is a 59 y.o. male here for upper endoscopy and colonoscopy, and he is stable and optimized for his procedure.        "

## 2024-12-31 ENCOUNTER — RESULTS FOLLOW-UP (OUTPATIENT)
Dept: GASTROENTEROLOGY | Facility: CLINIC | Age: 59
End: 2024-12-31

## 2024-12-31 DIAGNOSIS — K27.9 PEPTIC ULCER: Primary | ICD-10-CM

## 2024-12-31 RX ORDER — OMEPRAZOLE 40 MG/1
40 CAPSULE, DELAYED RELEASE ORAL DAILY
Qty: 90 CAPSULE | Refills: 0 | Status: SHIPPED | OUTPATIENT
Start: 2024-12-31

## 2025-01-02 ENCOUNTER — APPOINTMENT (OUTPATIENT)
Dept: LAB | Facility: CLINIC | Age: 60
End: 2025-01-02
Payer: COMMERCIAL

## 2025-01-02 ENCOUNTER — APPOINTMENT (OUTPATIENT)
Dept: RADIOLOGY | Facility: CLINIC | Age: 60
End: 2025-01-02
Payer: COMMERCIAL

## 2025-01-02 DIAGNOSIS — M19.041 PRIMARY OSTEOARTHRITIS OF HANDS, BILATERAL: ICD-10-CM

## 2025-01-02 DIAGNOSIS — M19.042 PRIMARY OSTEOARTHRITIS OF HANDS, BILATERAL: ICD-10-CM

## 2025-01-02 DIAGNOSIS — Z12.5 SCREENING FOR PROSTATE CANCER: ICD-10-CM

## 2025-01-02 DIAGNOSIS — Z13.6 SCREENING FOR CARDIOVASCULAR CONDITION: ICD-10-CM

## 2025-01-02 DIAGNOSIS — E78.5 DYSLIPIDEMIA, GOAL LDL BELOW 130: ICD-10-CM

## 2025-01-02 DIAGNOSIS — Z13.228 SCREENING FOR ENDOCRINE, METABOLIC AND IMMUNITY DISORDER: ICD-10-CM

## 2025-01-02 DIAGNOSIS — Z13.0 SCREENING FOR ENDOCRINE, METABOLIC AND IMMUNITY DISORDER: ICD-10-CM

## 2025-01-02 DIAGNOSIS — Z13.29 SCREENING FOR ENDOCRINE, METABOLIC AND IMMUNITY DISORDER: ICD-10-CM

## 2025-01-02 LAB
BILIRUB UR QL STRIP: NEGATIVE
CHOLEST SERPL-MCNC: 238 MG/DL (ref ?–200)
CLARITY UR: CLEAR
COLOR UR: NORMAL
GLUCOSE UR STRIP-MCNC: NEGATIVE MG/DL
HDLC SERPL-MCNC: 46 MG/DL
HGB UR QL STRIP.AUTO: NEGATIVE
KETONES UR STRIP-MCNC: NEGATIVE MG/DL
LDLC SERPL CALC-MCNC: 165 MG/DL (ref 0–100)
LDLC SERPL DIRECT ASSAY-MCNC: 173 MG/DL (ref 0–100)
LEUKOCYTE ESTERASE UR QL STRIP: NEGATIVE
NITRITE UR QL STRIP: NEGATIVE
NONHDLC SERPL-MCNC: 192 MG/DL
PH UR STRIP.AUTO: 6 [PH]
PROT UR STRIP-MCNC: NEGATIVE MG/DL
SP GR UR STRIP.AUTO: 1.01 (ref 1–1.03)
TRIGL SERPL-MCNC: 133 MG/DL (ref ?–150)
TSH SERPL DL<=0.05 MIU/L-ACNC: 3.27 UIU/ML (ref 0.45–4.5)
UROBILINOGEN UR STRIP-ACNC: <2 MG/DL

## 2025-01-02 PROCEDURE — 81003 URINALYSIS AUTO W/O SCOPE: CPT

## 2025-01-02 PROCEDURE — 84443 ASSAY THYROID STIM HORMONE: CPT

## 2025-01-02 PROCEDURE — 36415 COLL VENOUS BLD VENIPUNCTURE: CPT

## 2025-01-02 PROCEDURE — 83721 ASSAY OF BLOOD LIPOPROTEIN: CPT

## 2025-01-02 PROCEDURE — 80061 LIPID PANEL: CPT

## 2025-01-02 PROCEDURE — 73130 X-RAY EXAM OF HAND: CPT

## 2025-01-07 ENCOUNTER — RESULTS FOLLOW-UP (OUTPATIENT)
Dept: FAMILY MEDICINE CLINIC | Facility: CLINIC | Age: 60
End: 2025-01-07

## 2025-01-07 DIAGNOSIS — E78.5 DYSLIPIDEMIA, GOAL LDL BELOW 130: Primary | ICD-10-CM

## 2025-01-07 RX ORDER — ROSUVASTATIN CALCIUM 10 MG/1
10 TABLET, COATED ORAL DAILY
Qty: 100 TABLET | Refills: 1 | Status: SHIPPED | OUTPATIENT
Start: 2025-01-07

## 2025-01-17 ENCOUNTER — TELEPHONE (OUTPATIENT)
Dept: RHEUMATOLOGY | Facility: CLINIC | Age: 60
End: 2025-01-17

## 2025-01-17 NOTE — TELEPHONE ENCOUNTER
Could you please check with him if he would be ok switching his appt on 3/6 to a VV at the same time.

## 2025-01-21 ENCOUNTER — OFFICE VISIT (OUTPATIENT)
Dept: OBGYN CLINIC | Facility: CLINIC | Age: 60
End: 2025-01-21
Payer: COMMERCIAL

## 2025-01-21 VITALS — WEIGHT: 202 LBS | HEIGHT: 63 IN | BODY MASS INDEX: 35.79 KG/M2

## 2025-01-21 DIAGNOSIS — M65.342 TRIGGER FINGER, LEFT RING FINGER: Primary | ICD-10-CM

## 2025-01-21 DIAGNOSIS — M19.041 PRIMARY OSTEOARTHRITIS OF HANDS, BILATERAL: ICD-10-CM

## 2025-01-21 DIAGNOSIS — G56.02 CARPAL TUNNEL SYNDROME ON LEFT: ICD-10-CM

## 2025-01-21 DIAGNOSIS — M19.042 PRIMARY OSTEOARTHRITIS OF HANDS, BILATERAL: ICD-10-CM

## 2025-01-21 DIAGNOSIS — G56.01 CARPAL TUNNEL SYNDROME ON RIGHT: ICD-10-CM

## 2025-01-21 PROCEDURE — 20550 NJX 1 TENDON SHEATH/LIGAMENT: CPT | Performed by: ORTHOPAEDIC SURGERY

## 2025-01-21 PROCEDURE — 99204 OFFICE O/P NEW MOD 45 MIN: CPT | Performed by: ORTHOPAEDIC SURGERY

## 2025-01-21 RX ORDER — BETAMETHASONE SODIUM PHOSPHATE AND BETAMETHASONE ACETATE 3; 3 MG/ML; MG/ML
6 INJECTION, SUSPENSION INTRA-ARTICULAR; INTRALESIONAL; INTRAMUSCULAR; SOFT TISSUE
Status: COMPLETED | OUTPATIENT
Start: 2025-01-21 | End: 2025-01-21

## 2025-01-21 RX ADMIN — BETAMETHASONE SODIUM PHOSPHATE AND BETAMETHASONE ACETATE 6 MG: 3; 3 INJECTION, SUSPENSION INTRA-ARTICULAR; INTRALESIONAL; INTRAMUSCULAR; SOFT TISSUE at 10:45

## 2025-01-21 NOTE — PROGRESS NOTES
"Assessment/Plan:  1. Trigger finger, left ring finger        2. Primary osteoarthritis of hands, bilateral  Ambulatory Referral to Orthopedic Surgery      3. Carpal tunnel syndrome on right  US [facility] limited      4. Carpal tunnel syndrome on left  US [facility] limited          Subjective history, clinical exam, and diagnostic studies reviewed with patient  Each diagnosis discussed  While I was not able to elicit clinical findings consistent with carpal tunnel syndrome, given the patient's history, I suspect he has carpal tunnel syndrome.  I recommended [facility] ultrasound to evaluate for carpal tunnel syndrome; and CSI for R TF.    Symptoms may recur following CSI, especially in patients who present with longstanding symptoms and patient who have diabetes.  May have repeat CSI after 8 weeks.  If symptoms recur after 2 CSI's, patient will be offered surgery in the form of trigger finger release or the option of a 3rd CSI.  The patient was given the opportunity to ask questions.  Questions were answered to the patient's satisfaction.  The patient decided to move forward with CSI via shared decision making.  Follow up in 8 weeks for clinical evaluation. If asymptomatic, may cancel appointment       The patient was given the opportunity to ask questions.  Questions were answered to the patient's satisfaction.  The patient decided to move forward with [facility] US for CTS and CSI for L RF TF via shared decision making.  Follow up after [facility] US    Hand/upper extremity injection: L ring A1  Universal Protocol:  Consent: Verbal consent obtained.  Risks and benefits: risks, benefits and alternatives were discussed  Consent given by: patient  Time out: Immediately prior to procedure a \"time out\" was called to verify the correct patient, procedure, equipment, support staff and site/side marked as required.  Patient understanding: patient states understanding of the procedure being performed  Patient identity confirmed: verbally with " patient  Supporting Documentation  Indications: tendon swelling and therapeutic   Procedure Details  Condition:trigger finger Location: ring finger - L ring A1   Preparation: Patient was prepped and draped in the usual sterile fashion  Needle size: 25 G  Ultrasound guidance: no  Medications administered: 6 mg betamethasone acetate-betamethasone sodium phosphate 6 (3-3) mg/mL  Patient tolerance: patient tolerated the procedure well with no immediate complications  Dressing:  Sterile dressing applied              cc: my hands bother me and my finger locks    Subjective:   Faustino Gallagher is a right hand dominant 59 y.o. male who presents for evaluation and treatment of bilateral hand pain.  He reported that when he wakes up in the morning he has a sensation of swelling and stiffness.  Usually the symptoms resolve within a couple of hours upon awakening.  He also reported that the left ring finger has been locking and catching for the past 6 months.  He wears a compression glove in the evening and sometimes at night which seem to help with his symptoms.            History reviewed. No pertinent past medical history.    Past Surgical History:   Procedure Laterality Date    APPENDECTOMY  1991    COLONOSCOPY  2019    [facility]    DENTAL SURGERY      Tooth extraction       Family History   Problem Relation Age of Onset    No Known Problems Mother     No Known Problems Father     No Known Problems Sister     No Known Problems Brother        Social History     Occupational History    Not on file   Tobacco Use    Smoking status: Never     Passive exposure: Never    Smokeless tobacco: Never   Vaping Use    Vaping status: Never Used   Substance and Sexual Activity    Alcohol use: Never    Drug use: Never    Sexual activity: Yes     Partners: Female     Birth control/protection: None         Current Outpatient Medications:     glucosamine-chondroitin 500-400 MG tablet, Take 1 tablet by mouth daily, Disp: , Rfl:      meloxicam (Mobic) 15 mg tablet, Daily as needed, Disp: 90 tablet, Rfl: 0    omeprazole (PriLOSEC) 40 MG capsule, Take 1 capsule (40 mg total) by mouth daily, Disp: 90 capsule, Rfl: 0    rosuvastatin (CRESTOR) 10 MG tablet, Take 1 tablet (10 mg total) by mouth daily, Disp: 100 tablet, Rfl: 1    No Known Allergies    Objective:      The patient was awake, alert, and oriented to person, place, and time.  No acute distress.  Normocephalic.  EOMI.  Mucous membranes moist.  Normal respiratory effort.    B/L UE Exam:  Skin was intact.  No swelling or ecchymosis.  No deformity.  Hand and fingers were warm and well-perfused.  Capillary refill was brisk.  Full active range of motion of the elbows, forearms, wrists, and fingers.  5/5 elbow flexors/extensors, wrist flexor/extensors, and .       Sensation was not decreased in the median nerve distribution.  Sensation was not decreased in the ulnar nerve distribution.  There was not APB atrophy.  There was no intrinsic atrophy. Tinel's at the wrist was Negative. Tinel's at the elbow was Negative. Wrist flexion compression was negative. Evaluation for lacertus syndrome was Negative.        The affected finger was not postured in a flexed position.  There was tenderness at the level of the A1 pulley.  There was crepitus with flexion and extension of the finger.  Catching/locking was observed during the examination.      Imaging/Diagnostic Studies:    I reviewed imaging studies dated 1/2/25 which included 3 views of each hand.  These images studies demonstrated normal osseous anatomy with no obvious signs of degenerative changes.        This document was created using speech voice recognition software.   Grammatical errors, random word insertions, pronoun errors, and incomplete sentences are an occasional consequence of this system due to software limitations, ambient noise, and hardware issues.   Any formal questions or concerns about content, text, or information contained  within the body of this dictation should be directly addressed to the provider for clarification.

## 2025-02-04 ENCOUNTER — HOSPITAL ENCOUNTER (OUTPATIENT)
Dept: ULTRASOUND IMAGING | Facility: HOSPITAL | Age: 60
Discharge: HOME | End: 2025-02-04
Attending: ORTHOPAEDIC SURGERY
Payer: COMMERCIAL

## 2025-02-04 DIAGNOSIS — G56.02 CARPAL TUNNEL SYNDROME ON LEFT: ICD-10-CM

## 2025-02-04 DIAGNOSIS — G56.01 CARPAL TUNNEL SYNDROME ON RIGHT: ICD-10-CM

## 2025-02-04 PROCEDURE — 76882 US LMTD JT/FCL EVL NVASC XTR: CPT

## 2025-02-15 DIAGNOSIS — M17.0 OSTEOARTHRITIS OF BOTH KNEES, UNSPECIFIED OSTEOARTHRITIS TYPE: ICD-10-CM

## 2025-02-17 RX ORDER — MELOXICAM 15 MG/1
TABLET ORAL
Qty: 90 TABLET | Refills: 1 | Status: SHIPPED | OUTPATIENT
Start: 2025-02-17

## 2025-03-03 PROBLEM — Z79.1 NSAID LONG-TERM USE: Status: ACTIVE | Noted: 2025-03-03

## 2025-03-04 ENCOUNTER — TELEPHONE (OUTPATIENT)
Dept: RHEUMATOLOGY | Facility: CLINIC | Age: 60
End: 2025-03-04

## 2025-03-04 ENCOUNTER — TELEMEDICINE (OUTPATIENT)
Dept: RHEUMATOLOGY | Facility: CLINIC | Age: 60
End: 2025-03-04
Payer: COMMERCIAL

## 2025-03-04 DIAGNOSIS — Z79.1 NSAID LONG-TERM USE: ICD-10-CM

## 2025-03-04 DIAGNOSIS — K27.9 PUD (PEPTIC ULCER DISEASE): ICD-10-CM

## 2025-03-04 DIAGNOSIS — M17.0 OSTEOARTHRITIS OF BOTH KNEES, UNSPECIFIED OSTEOARTHRITIS TYPE: Primary | ICD-10-CM

## 2025-03-04 PROCEDURE — 99214 OFFICE O/P EST MOD 30 MIN: CPT | Performed by: INTERNAL MEDICINE

## 2025-03-04 NOTE — PROGRESS NOTES
Virtual Regular VisitName: Faustino Gallagher      : 1965      MRN: [id number]  Encounter Provider: Maria Fernanda Preciado MD  Encounter Date: 3/4/2025   Encounter department: [facility] [place]  :  Assessment & Plan  Osteoarthritis of both knees, unspecified osteoarthritis type  Mr. Gallagher is a 59-year-old male with history significant for bilateral knee osteoarthritis (presumed to be primary in nature) who presents for a follow-up.  He is currently on meloxicam 15 mg once daily.     - Fredi presents today for a follow-up of bilateral knee pain which has been ongoing since at least  with an evaluation being done to determine if this is secondary to primary osteoarthritis versus an inflammatory arthritis.  Although he does describe inflammatory symptoms such as recurrent episodes of swelling and morning stiffness of the knees along with an excellent response to oral steroids, his serological workup has been fairly unrevealing and an MRI of his right knee done in 2021 was more suggestive of primary osteoarthritis.  I will continue to manage him for this.     - Since the last office visit he has been taking meloxicam 15 mg once daily to every other day as well as Osteo Bi-Flex which significantly helps with his symptoms.  He has not experienced any new major concerns during the year.  To avoid long-term NSAID use and possible side effects [there are already concerns for NSAID induced PUD] I requested he try and minimize meloxicam use as much as possible and substitute with Tylenol up to 3 g daily in divided doses.  He will update high risk medication lab monitoring to assess for drug toxicities prior to the follow-up visit.    Orders:    C-reactive protein; Future    Sedimentation rate, automated; Future    NSAID long-term use  - I reviewed his recent upper endoscopy which does show duodenal ulcerations which is likely NSAID induced.  I advised him to minimize meloxicam use as  much as possible and continue the omeprazole daily while on the meloxicam.  I also encouraged him to schedule a follow-up with gastroenterology to determine if he needs an upper endoscopy repeated at any time to assess for healing of the ulcerations.    Orders:    CBC and differential; Future    Comprehensive metabolic panel; Future    PUD (peptic ulcer disease)         Osteoarthritis of both knees, unspecified osteoarthritis type         NSAID long-term use           Patient's rheumatologic disease(s) threaten long-term function if not appropriately managed.      History of Present Illness     HPI    INITIAL VISIT NOTE (7/2021):  Mr. Gallagher is a 55-year-old male with history significant for bilateral knee osteoarthritis (presumed to be primary in nature), who presents for further evaluation of this.  He is referred by Dr. Jung for a rheumatology consult.       Patient reports over the past 15 years he has experienced recurrent pain and swelling affecting his bilateral knees, primarily on the right side.  He was seen by Rheumatology in [place] for these symptoms a few years ago and had a thorough rheumatological workup done which was unrevealing.  His presentation was thought to be consistent with primary osteoarthritis.  He has received intra-articular cortisone injections 1-2 times and this has helped him significantly.  No recent injections.  He has also had multiple arthrocentesis performed which apparently have been unrevealing.  His most recent one was done in April 2019 and showed a synovial fluid WBC count of 2464 with a negative synovial fluid crystal and Lyme PCR analysis.  There was a note mentioning extensive inflammation and reactive changes were present.  He has not had knee x-rays done in a few years but apparently this has showed tricompartmental narrowing with joint effusions.  He has not had an MRI of his knees.     He reports over the past 15 years his symptoms have progressively worsened  where he experiences some degree of pain and swelling on a daily and constant basis, but states that the swelling can flare-up.  He reports over the past 15 years he has also had intermittent pain that will affect his bilateral hands (in the PIP and MCP joints), wrists, ankles and feet.  These episodes are usually short-lived and infrequent.  He denies any joint pains in his elbows, shoulders, low back or hips.  Other than the knee swelling he denies other joint swelling.  He experiences morning stiffness which affects his knees and can take 2-3 hours to improve.  He has tried naproxen in the past which has provided him with mild relief which is short-lived.  He has been on diclofenac 75 mg twice a day as needed and states that this does not really help.  He has not been prescribed repeat courses of steroids in the past.  He is currently on a methylprednisolone Dosepak that was started on July 10th after he was seen in the urgent care for a skin rash that he has been experiencing on his knees for the past 5 days.  He reports with his current steroid use this has significantly helped with the bilateral knee pain and swelling and he feels like the skin rash is also clearing up.  He reports that the skin rash has occurred for the first time and is not a recurrent issue.     He mentions over the past 2-3 months he has been experiencing fevers up to 102° F which will last 24 hours and occurs every 7-10 days.  He has not seen his primary care physician for this.  He denies night sweats, unintentional weight loss, inflammatory eye disease (he does experience chronic dry eyes and occasional dry mouth), other types of skin rash, psoriasis, mouth/nose ulcers (can occasionally experience a canker sore related to increased stress), swollen glands, pleuritic chest pain, inflammatory bowel disease, blood clots or a family history of autoimmune disease.  He denies any other medical conditions or symptoms and reports that he is  otherwise healthy.        9/3/2021:  Patient presents for a follow up visit today.  The labs showed a positive ZULEMA 1:160 dense fine speckled pattern.  A C reactive protein was slightly elevated at 15.  A ferritin, CK, ESR, uric acid, HLA B27 antigen, anti CCP antibody, rheumatoid factor, Sjogren's antibodies and hepatitis panel were unremarkable.  X-rays of the bilateral knees showed osteoarthritis with small joint effusions.     After the last office visit we discussed discontinuing the diclofenac as it was not helping with his symptoms.  I replaced this with meloxicam 15 mg once a day as needed and he states that this has been helping him with his overall joint pains, but he still experiences pain in his knees as well as at the heels of his bilateral feet, which is more prominent first thing in the morning with the first few steps and then gradually improves.  No other significant joint pains at this time.  He still notices swelling of his knees, more prominently on the right side.  He states that taking meloxicam in combination with the multivitamin seems to be helping him as well.  Since starting the meloxicam he has not had any fevers.  No other complaints today.        3/4/2022:  Patient presents for a follow-up of bilateral knee pain.  He is currently on meloxicam 15 mg once daily.  He did not have a chance to do the ZULEMA related blood work after the last office visit.  I did review the MRI of his right knee which showed:     Longitudinal tear of the medial meniscus.     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  Grade 3 patellofemoral chondrosis.     Moderate joint effusion with lipoma arborescens.  This is nonspecific, but not necessarily indicative of inflammatory arthritis.  In this patient, it is more likely related to osteoarthritis.     No bony erosive changes are seen.  There is no tenosynovitis of the knee.     He reports overall with the meloxicam he has been doing very well and  denies any knee pain, swelling or stiffness.  No additional joint complaints.  He is also taking Osteo Bi-Flex supplements which he thinks helps.        3/3/2023:  Patient presents for a follow-up of bilateral knee osteoarthritis.  He is currently on meloxicam 15 mg once a day as needed.  I reviewed his labs done after the last office visit which showed an unremarkable CBC, CMP, ZULEMA specificity, antiphospholipid antibody testing, C3, C4, urine analysis and urine protein creatinine ratio.     He reports he has overall done well over the past year with taking meloxicam 15 mg once a day as needed as well as Osteo Bi-Flex.  No concerning joint pains, swelling or stiffness.  He reports if he tries to miss the meloxicam for a few days he will notice a change in his symptoms.     His only other complaint is experiencing abdominal pain which improves after he eats.  He has also been taking Tums as needed which helps.  No vomiting, blood in stools or dark stools.        3/7/2024:  Patient presents for a follow-up of bilateral knee osteoarthritis.  He is currently on meloxicam 15 mg once every other day.  I reviewed his recent labs which shows a normal ESR, CRP and CMP.  A CBC showed anemia with a hemoglobin of 11.6 and ferritin of 10.  He will be undergoing bidirectional scopes in June and through his primary care physician was started on daily iron supplements.     He reports over the past year he has been fairly stable and has not had any worsening in his joint pains, swelling or stiffness.  The bilateral knee pain is still present but manageable with meloxicam 15 mg once every other day.  At baseline the knee pain is 3-4/10 in intensity but this improves after taking the meloxicam.  The Osteo Bi-Flex has also helped him.     He was seen by gastroenterology in view of the abdominal pain and acid reflux.  As mentioned the plan is to proceed with bidirectional scopes in June.  He will take short courses of the omeprazole as  needed which helps with the symptoms.  Currently he does not have any of these complaints.        3/4/2025:  Patient presents for a follow-up of bilateral knee osteoarthritis.  He is currently on meloxicam 15 mg once daily but does try to skip a day if he feels well.  I reviewed his CBC, CMP, ESR and CRP from 12/24 which were unremarkable.  In 12/24 he had an upper endoscopy done which showed multiple small, superficial ulcers in the duodenum.  Gastroenterology said this is likely NSAID induced and started him on omeprazole 40 mg once daily and advised him to continue it for as long as he is taking the meloxicam.    He reports with taking the meloxicam he has not been experiencing any concerning issues.  The knee pain is very stable.  He did see hand orthopedics for left hand ring finger trigger finger and received a steroid injection which helped.  Otherwise no new joint pains.  In the mornings he does notice some puffiness of his hands but with exercising his hands this quickly resolves.  He experiences widespread morning stiffness that takes about 5 minutes to improve and reports with morning exercises/stretches this resolves quickly.  He has also been taking Osteo Bi-Flex which does help with his joint pains.      Review of Systems  Constitutional: Negative for weight change, fevers, chills, night sweats, fatigue.  ENT/Mouth: Negative for hearing changes, ear pain, nasal congestion, sinus pain, hoarseness, sore throat, rhinorrhea, swallowing difficulty.   Eyes: Negative for pain, redness, discharge, vision changes.   Cardiovascular: Negative for chest pain, SOB, palpitations.   Respiratory: Negative for cough, sputum, wheezing, dyspnea.   Gastrointestinal: Negative for nausea, vomiting, diarrhea, constipation, pain, heartburn.  Genitourinary: Negative for dysuria, urinary frequency, hematuria.   Musculoskeletal: As per HPI.  Skin: Negative for skin rash, color changes.   Neuro: Negative for weakness, numbness,  tingling, loss of consciousness.   Psych: Negative for anxiety, depression.   Heme/Lymph: Negative for easy bruising, bleeding, lymphadenopathy.      Objective   There were no vitals taken for this visit.    Physical Exam  General: Well appearing, well nourished, in no distress. Oriented x 3, normal mood and affect.  Ambulating without difficulty.  Skin: Good turgor, no rash, unusual bruising or prominent lesions.  HEENT:  Head: Normocephalic, atraumatic.  Eyes: Conjunctiva clear, sclera non-icteric, EOM intact.  Nose: No external lesions.  Neck: Supple.  Neurologic: Alert and oriented. No focal neurological deficits appreciated.   Psychiatric: Normal mood and affect.     Administrative Statements   Encounter provider Maria Fernanda Preciado MD    The Patient is located at Home and in the following state in which I hold an active license [place].    The patient was identified by name and date of birth. Faustino Gallagher was informed that this is a telemedicine visit and that the visit is being conducted through the Epic Embedded platform. He agrees to proceed..  My office door was closed. No one else was in the room.  He acknowledged consent and understanding of privacy and security of the video platform. The patient has agreed to participate and understands they can discontinue the visit at any time.    I have spent a total time of 21 minutes in caring for this patient on the day of the visit/encounter including Diagnostic results, Risks and benefits of tx options, Instructions for management, Patient and family education, Documenting in the medical record, Reviewing/placing orders in the medical record (including tests, medications, and/or procedures), and Obtaining or reviewing history  , not including the time spent for establishing the audio/video connection.     Ambulatory

## 2025-03-04 NOTE — ASSESSMENT & PLAN NOTE
Mr. Gallagher is a 59-year-old male with history significant for bilateral knee osteoarthritis (presumed to be primary in nature) who presents for a follow-up.  He is currently on meloxicam 15 mg once daily.     - Fredi presents today for a follow-up of bilateral knee pain which has been ongoing since at least 2007 with an evaluation being done to determine if this is secondary to primary osteoarthritis versus an inflammatory arthritis.  Although he does describe inflammatory symptoms such as recurrent episodes of swelling and morning stiffness of the knees along with an excellent response to oral steroids, his serological workup has been fairly unrevealing and an MRI of his right knee done in October 2021 was more suggestive of primary osteoarthritis.  I will continue to manage him for this.     - Since the last office visit he has been taking meloxicam 15 mg once daily to every other day as well as Osteo Bi-Flex which significantly helps with his symptoms.  He has not experienced any new major concerns during the year.  To avoid long-term NSAID use and possible side effects [there are already concerns for NSAID induced PUD] I requested he try and minimize meloxicam use as much as possible and substitute with Tylenol up to 3 g daily in divided doses.  He will update high risk medication lab monitoring to assess for drug toxicities prior to the follow-up visit.    Orders:    C-reactive protein; Future    Sedimentation rate, automated; Future

## 2025-03-04 NOTE — ASSESSMENT & PLAN NOTE
- I reviewed his recent upper endoscopy which does show duodenal ulcerations which is likely NSAID induced.  I advised him to minimize meloxicam use as much as possible and continue the omeprazole daily while on the meloxicam.  I also encouraged him to schedule a follow-up with gastroenterology to determine if he needs an upper endoscopy repeated at any time to assess for healing of the ulcerations.    Orders:    CBC and differential; Future    Comprehensive metabolic panel; Future

## 2025-03-18 ENCOUNTER — OFFICE VISIT (OUTPATIENT)
Dept: OBGYN CLINIC | Facility: CLINIC | Age: 60
End: 2025-03-18
Payer: COMMERCIAL

## 2025-03-18 VITALS — BODY MASS INDEX: 35.78 KG/M2 | HEIGHT: 63 IN

## 2025-03-18 DIAGNOSIS — G56.03 CARPAL TUNNEL SYNDROME, BILATERAL: Primary | ICD-10-CM

## 2025-03-18 PROCEDURE — 99213 OFFICE O/P EST LOW 20 MIN: CPT | Performed by: ORTHOPAEDIC SURGERY

## 2025-03-18 PROCEDURE — 20526 THER INJECTION CARP TUNNEL: CPT | Performed by: ORTHOPAEDIC SURGERY

## 2025-03-18 RX ORDER — BETAMETHASONE SODIUM PHOSPHATE AND BETAMETHASONE ACETATE 3; 3 MG/ML; MG/ML
6 INJECTION, SUSPENSION INTRA-ARTICULAR; INTRALESIONAL; INTRAMUSCULAR; SOFT TISSUE
Status: COMPLETED | OUTPATIENT
Start: 2025-03-18 | End: 2025-03-18

## 2025-03-18 RX ORDER — LIDOCAINE HYDROCHLORIDE 10 MG/ML
1 INJECTION, SOLUTION INFILTRATION; PERINEURAL
Status: COMPLETED | OUTPATIENT
Start: 2025-03-18 | End: 2025-03-18

## 2025-03-18 RX ORDER — LIDOCAINE HYDROCHLORIDE 20 MG/ML
1 INJECTION, SOLUTION INFILTRATION; PERINEURAL
Status: COMPLETED | OUTPATIENT
Start: 2025-03-18 | End: 2025-03-18

## 2025-03-18 RX ADMIN — LIDOCAINE HYDROCHLORIDE 1 ML: 20 INJECTION, SOLUTION INFILTRATION; PERINEURAL at 08:30

## 2025-03-18 RX ADMIN — LIDOCAINE HYDROCHLORIDE 1 ML: 10 INJECTION, SOLUTION INFILTRATION; PERINEURAL at 08:30

## 2025-03-18 RX ADMIN — BETAMETHASONE SODIUM PHOSPHATE AND BETAMETHASONE ACETATE 6 MG: 3; 3 INJECTION, SUSPENSION INTRA-ARTICULAR; INTRALESIONAL; INTRAMUSCULAR; SOFT TISSUE at 08:30

## 2025-03-18 NOTE — PROGRESS NOTES
ORTHOPAEDIC HAND, WRIST, AND ELBOW OFFICE  VISIT     Name: Faustino Gallagher      : 1965      MRN: [id number]  Encounter Provider: Rozina Silva MD  Encounter Date: 3/18/2025   [facility]  :  Assessment & Plan  Carpal tunnel syndrome, bilateral  Subjective history, clinical exam, and diagnostic studies reviewed with patient  Diagnosis discussed - bilateral carpal tunnel syndrome   Treatment options discussed which include nonoperative and operative management.  We discussed use of splinting, therapy, activity modification, use of NSAIDs (oral and topical), and injections.  Discussed use of MSK US in diagnosis of peripheral nerve compression. We discussed risks and benefits of each and well as expected reasonable outcomes.  Surgery can be considered following unsuccessful treatment with nonoperative management.    The patient was given the opportunity to ask questions.  Questions were answered to the patient's satisfaction.  The patient decided to move forward with bilateral carpal tunnel corticosteroid injections via shared decision making.  The patient was offered a corticosteroid injection for their bilateral carpal tunnels. They tolerated the procedure well.  The patient was educated they may have some irritation in the next few days and should rest, ice, elevate and perform gentle range of motion exercises. They were advised the medicine should begin to work in a few days time.    Follow up in 8 weeks.   Orders:    Hand/upper extremity injection: R carpal tunnel    Hand/upper extremity injection: L carpal tunnel        General Discussions:  Carpal Tunnel Syndrome: The anatomy and physiology of carpal tunnel syndrome was discussed with the patient today.  Increase pressure localized under the transverse carpal ligament can cause pain, numbness, tingling, or dysesthesias within the median nerve distribution as well as feelings  of fatigue, clumsiness, or awkwardness.  These symptoms typically occur at night and worse in the morning upon waking.  Eventually, untreated carpal tunnel syndrome can result in weakness and permanent loss of muscle within the thenar compartment of the hand.  Treatment options were discussed with the patient.  Conservative treatment includes nocturnal resting splints to keep the nerve in a neutral position, ergonomic changes within the work or home environment, activity modification, and tendon gliding exercises.  Steroid injections within the carpal canal can help a majority of patients, however this is often self-limited in a most patients.  Surgical intervention to divide the transverse carpal ligament typically results in a long-lasting relief of the patient's complaints, with the recurrence rate of less than 5%.     Operative Discussions:  Carpal Tunnel Release:   The anatomy and physiology of carpal tunnel syndrome was discussed with the patient today.  Increase pressure localized under the transverse carpal ligament can cause pain, numbness, tingling, or dysesthesias within the median nerve distribution as well as feelings of fatigue, clumsiness, or awkwardness.  These symptoms typically occur at night and worse in the morning upon waking.  Eventually, untreated carpal tunnel syndrome can result in weakness and permanent loss of muscle within the thenar compartment of the hand.  Treatment options were discussed with the patient.  Conservative treatment includes nocturnal resting splints to keep the nerve in a neutral position, ergonomic changes within the work or home environment, activity modification, and tendon gliding exercises.  Steroid injections within the carpal canal can help a majority of patients, however this is often self-limited in a majority of patients.  Surgical intervention to divide the transverse carpal ligament typically results in a long-lasting relief of the patient's complaints, with  the recurrence rate of less than 5%. The patient has elected to undergo an endoscopic carpal tunnel release.  The 2 incision technique was discussed with the patient, which results in approximately a two-week less recovery time, and less wound complications.  In the postoperative period, light activities are allowed immediately, driving is allowed when narcotic medication has stopped, and the bandages may be removed and incision may get wet after 5 days.  Heavy activities (lifting more than approximately 5 pounds) will be allowed after follow up appointment in 1-2 weeks.  While the pain and discomfort in the hands generally improves rapidly, the numbness and tingling as well as the strength will slowly improve over weeks to months depending on the severity of the carpal tunnel syndrome.  Pillar pain was discussed with the patient, which is typically a common but self-limiting condition.  The risks of bleeding and infection from the surgery are less than 1%.  Risk of recurrence is less than 5%.  The risks of nerve injury or nerve damage or damage to the blood vessels is approximately 1 in 1000. The patient has an understanding of the above mentioned discussion.The risks and benefits of the procedure were explained to the patient, which include, but are not limited to: Bleeding, infection, recurrence, pain, scar, damage to tendons, damage to nerves, and damage to blood vessels, failure to give desired results and complications related to anesthesia.  These risks, along with alternative conservative treatment options, and postoperative protocols were voiced back and understood by the patient.  All questions were answered to the patient's satisfaction.  The patient agrees to comply with a standard postoperative protocol, and is willing to proceed.  Education was provided via written and auditory forms.  There were no barriers to learning.     History of Present Illness   HPI  Chief Complaint   Patient presents with     "Left Hand - Follow-up, Pain    Right Hand - Follow-up, Pain       Faustino Gallagher is a 59 y.o. male who presents for follow-up of suspected bilateral carpal tunnel syndrome and right ringer trigger finger. The patient is status post corticosteroid injection performed on 1/21/25 to right ring finger with improvement in symptoms and occasional discomfort. He also reports to the office to review his ultrasound studies. He continues to have mild numbness and tingling in the morning. The patient does home exercises and stretches first thing in the morning which helps improves his symptoms.     Hand dominance: Right     REVIEW OF SYSTEMS:  General: no fever, no chills  HEENT:  No loss of hearing or eyesight problems  Eyes:  No red eyes  Respiratory:  No coughing, shortness of breath or wheezing  Cardiovascular:  No chest pain, no palpitations  GI:  Abdomen soft nontender, denies nausea  Endocrine:  No muscle weakness, no frequent urination, no excessive thirst  Urinary:  No dysuria, no incontinence  Musculoskeletal: see HPI and PE  SKIN:  No skin rash, no dry skin  Neurological:  No headaches, no confusion  Psychiatric:  No suicide thoughts, no anxiety, no depression  Review of all other systems is negative    Objective   Ht 5' 3\" (1.6 m)   BMI 35.78 kg/m²      General: well developed and well nourished, alert, oriented times 3, and appears comfortable  Psychiatric: Normal  HEENT: Trachea Midline, No torticollis  Cardiovascular: No discernable arrhythmia  Pulmonary: No wheezing or stridor  Abdomen: No rebound or guarding  Extremities: No peripheral edema  Skin: No masses, erythema, lacerations, fluctation, ulcerations  Neurovascular: Sensation Intact to the Median, Ulnar, Radial Nerve, Motor Intact to the Median, Ulnar, Radial Nerve, and Pulses Intact    Musculoskeletal exam:  Bilateral hands/wrists:    Sensation was not decreased in the median nerve distribution.  Sensation was not decreased in the ulnar nerve " "distribution.  There was not APB atrophy.  There was no intrinsic atrophy. Tinel's at the wrist was Negative. Tinel's at the elbow was Negative. Wrist flexion compression was negative. Evaluation for lacertus syndrome was Negative.    Left ring finger trigger finger with mild catching.      STUDIES REVIEWED:  Bilateral ultrasound studies of wrists were reviewed and demonstrate mild carpal tunnel syndrome of the left and an increase in swelling of the right median nerve. Suspicious for carpal tunnel syndrome.      PROCEDURES PERFORMED:  Hand/upper extremity injection: R carpal tunnel  [place] Protocol:  Consent: Verbal consent obtained.  Risks and benefits: risks, benefits and alternatives were discussed  Consent given by: patient  Patient understanding: patient states understanding of the procedure being performed  Site marked: the operative site was marked  Patient identity confirmed: verbally with patient  Supporting Documentation  Indications: therapeutic   Procedure Details  Condition:carpal tunnel syndrome Site: R carpal tunnel   Needle size: 25 G  Ultrasound guidance: no  Approach: volar  Medications administered: 1 mL lidocaine 2 %; 6 mg betamethasone acetate-betamethasone sodium phosphate 6 (3-3) mg/mL  Patient tolerance: patient tolerated the procedure well with no immediate complications  Dressing:  Sterile dressing applied       Hand/upper extremity injection: L carpal tunnel  [place] Protocol:  Consent: Verbal consent obtained.  Risks and benefits: risks, benefits and alternatives were discussed  Consent given by: patient  Time out: Immediately prior to procedure a \"time out\" was called to verify the correct patient, procedure, equipment, support staff and site/side marked as required.  Patient understanding: patient states understanding of the procedure being performed  Site marked: the operative site was marked  Patient identity confirmed: verbally with patient  Supporting " Documentation  Indications: tendon swelling and pain   Procedure Details  Condition:carpal tunnel syndrome Site: L carpal tunnel   Preparation: Patient was prepped and draped in the usual sterile fashion  Needle size: 25 G  Ultrasound guidance: no  Approach: volar  Medications administered: 6 mg betamethasone acetate-betamethasone sodium phosphate 6 (3-3) mg/mL; 1 mL lidocaine 1 %  Patient tolerance: patient tolerated the procedure well with no immediate complications  Dressing:  Sterile dressing applied         -

## 2025-03-30 DIAGNOSIS — K27.9 PEPTIC ULCER: ICD-10-CM

## 2025-03-31 RX ORDER — OMEPRAZOLE 40 MG/1
40 CAPSULE, DELAYED RELEASE ORAL DAILY
Qty: 90 CAPSULE | Refills: 0 | Status: SHIPPED | OUTPATIENT
Start: 2025-03-31

## 2025-05-19 ENCOUNTER — TELEMEDICINE (OUTPATIENT)
Dept: OTHER | Facility: HOSPITAL | Age: 60
End: 2025-05-19
Payer: COMMERCIAL

## 2025-05-19 ENCOUNTER — OFFICE VISIT (OUTPATIENT)
Dept: URGENT CARE | Facility: CLINIC | Age: 60
End: 2025-05-19
Payer: COMMERCIAL

## 2025-05-19 VITALS
DIASTOLIC BLOOD PRESSURE: 80 MMHG | TEMPERATURE: 97.2 F | OXYGEN SATURATION: 98 % | HEIGHT: 63 IN | SYSTOLIC BLOOD PRESSURE: 120 MMHG | BODY MASS INDEX: 35.79 KG/M2 | WEIGHT: 202 LBS | HEART RATE: 86 BPM | RESPIRATION RATE: 16 BRPM

## 2025-05-19 DIAGNOSIS — J01.00 ACUTE MAXILLARY SINUSITIS, RECURRENCE NOT SPECIFIED: Primary | ICD-10-CM

## 2025-05-19 DIAGNOSIS — H57.12 LEFT EYE PAIN: Primary | ICD-10-CM

## 2025-05-19 PROCEDURE — G0382 LEV 3 HOSP TYPE B ED VISIT: HCPCS | Performed by: PHYSICIAN ASSISTANT

## 2025-05-19 PROCEDURE — 99213 OFFICE O/P EST LOW 20 MIN: CPT | Performed by: PHYSICIAN ASSISTANT

## 2025-05-19 RX ORDER — FLUTICASONE PROPIONATE 50 MCG
1 SPRAY, SUSPENSION (ML) NASAL DAILY
Qty: 9.9 ML | Refills: 0 | Status: SHIPPED | OUTPATIENT
Start: 2025-05-19

## 2025-05-19 NOTE — PROGRESS NOTES
Virtual Regular VisitName: Faustino Gallagher      : 1965      MRN: [id number]  Encounter Provider: Luis Fernando Calhoun PA-C  Encounter Date: 2025   Encounter department: VIRTUAL CARE   :  Assessment & Plan  Left eye pain  Patient is complaining of 8 out of 10 pain in the left eye with movement of the eye of the left only.  Denies any visual disturbances or vision loss or diplopia.  No fevers or chills.  No significant swelling that is noticed of the face.  On virtual exam I do not appreciate any obvious sign of orbital or preseptal cellulitis.  I explained to the patient that given his lack of any nasal discharge, I cannot definitively make a diagnosis of sinusitis nor would expect a simple uncomplicated sinusitis to create this severe pain with movement of the eye.  He does report feeling of pressure in the left side of the face.  I did advise him to seek an in person evaluation today.  He tells me that he will contact his PCP first for further guidance.           History of Present Illness     Pt reports symptoms began around last Tuesday, worsening since Wednesday, fairly constant, using benadryl and sudafed which is only giving him relief. Pain is moderate to severe. Has pain with leftward gaze in left eye. No fever or chills. No dizziness, vertigo, nausea or vomiting. No discolored nasal mucus. Pain with eye movement is 8/10, no double vision, no decreased visual acuity.       Review of Systems   Constitutional:  Negative for chills and fever.   HENT:  Positive for congestion. Negative for ear pain, hearing loss, nosebleeds, postnasal drip, sinus pressure, sinus pain, sore throat and trouble swallowing.    Eyes:  Positive for pain. Negative for discharge, redness and visual disturbance.   Respiratory:  Negative for cough and shortness of breath.    Cardiovascular:  Negative for chest pain.   Gastrointestinal:  Negative for abdominal pain, diarrhea, nausea and vomiting.       Objective   There  were no vitals taken for this visit.    Physical Exam  Constitutional:       General: He is not in acute distress.     Appearance: He is well-developed.   HENT:      Head: Normocephalic and atraumatic.      Right Ear: External ear normal.      Left Ear: External ear normal.      Nose: No rhinorrhea.      Mouth/Throat:      Mouth: Mucous membranes are moist.      Pharynx: No posterior oropharyngeal erythema.     Eyes:      General: No scleral icterus.        Right eye: No discharge.         Left eye: No discharge.      Extraocular Movements: Extraocular movements intact.      Conjunctiva/sclera: Conjunctivae normal.      Comments: No evidence or orbital or preseptal cellulitis via virtual exam   Neck:      Trachea: No tracheal deviation.     Cardiovascular:      Rate and Rhythm: Normal rate.   Pulmonary:      Effort: Pulmonary effort is normal. No respiratory distress.      Breath sounds: No stridor.     Musculoskeletal:      Cervical back: Neck supple.     Skin:     Findings: No erythema.     Neurological:      Mental Status: He is alert.     Psychiatric:         Behavior: Behavior normal.         Administrative Statements   Encounter provider Luis Fernando Calhoun PA-C    The Patient is located at Home and in the following state in which I hold an active license [place].    The patient was identified by name and date of birth. [facility] was informed that this is a telemedicine visit and that the visit is being conducted through the Epic Embedded platform. He agrees to proceed..  My office door was closed. No one else was in the room.  He acknowledged consent and understanding of privacy and security of the video platform. The patient has agreed to participate and understands they can discontinue the visit at any time.    I have spent a total time of 11 minutes in caring for this patient on the day of the visit/encounter including Risks and benefits of tx options, Instructions for management, Patient and family  education, Impressions, Documenting in the medical record, and Reviewing/placing orders in the medical record (including tests, medications, and/or procedures), not including the time spent for establishing the audio/video connection.

## 2025-05-19 NOTE — PROGRESS NOTES
[facility]        NAME: Faustino Gallagher is a 59 y.o. male  : 1965    MRN: [id number]  DATE: May 19, 2025  TIME: 1:20 PM    There were no vitals taken for this visit.    Assessment and Plan   No primary diagnosis found.  No diagnosis found.      Patient Instructions       Follow up with PCP in 3-5 days.  Proceed to  ER if symptoms worsen.    Chief Complaint     Chief Complaint   Patient presents with    Eye Problem     Pt reports left eye pain with onset Tuesday with progression. Denies any known injury. Managing with benadryl and Sudafed with some relief. Denies any blurred vision. Denies any redness. Denies any swelling.          History of Present Illness       Pt with pain around left eye x 5-6 days  constant except better with tylenol, pt admits nasal congestion earlier none now, no headache hx, no change in vision     Eye Problem         Review of Systems   Review of Systems   Constitutional: Negative.    HENT: Negative.     Eyes:  Positive for pain.   Respiratory: Negative.     Cardiovascular: Negative.    Gastrointestinal: Negative.    Endocrine: Negative.    Genitourinary: Negative.    Musculoskeletal: Negative.    Skin: Negative.    Allergic/Immunologic: Negative.    Neurological: Negative.    Hematological: Negative.    Psychiatric/Behavioral: Negative.     All other systems reviewed and are negative.        Current Medications     Current Medications[1]    Current Allergies     Allergies as of 2025    (No Known Allergies)            The following portions of the patient's history were reviewed and updated as appropriate: allergies, current medications, past family history, past medical history, past social history, past surgical history and problem list.     Past Medical History:   Diagnosis Date    Osteoarthritis 2018       Past Surgical History:   Procedure Laterality Date    APPENDECTOMY      COLONOSCOPY  2019    [facility]    DENTAL SURGERY      Tooth  extraction       Family History   Problem Relation Age of Onset    No Known Problems Mother     No Known Problems Father     No Known Problems Sister     No Known Problems Brother          Medications have been verified.        Objective   There were no vitals taken for this visit.       Physical Exam     Physical Exam  Vitals and nursing note reviewed.   Constitutional:       Appearance: Normal appearance. He is normal weight.      Comments: Discussed with pt about goint to er for further eval for cause of headache, pt would like antibiotic for sinus  ,if not improving will go to er    HENT:      Head: Normocephalic and atraumatic.      Right Ear: Tympanic membrane, ear canal and external ear normal.      Left Ear: Tympanic membrane, ear canal and external ear normal.      Nose: Nose normal.      Mouth/Throat:      Mouth: Mucous membranes are moist.      Pharynx: Oropharynx is clear.     Eyes:      Extraocular Movements: Extraocular movements intact.      Conjunctiva/sclera: Conjunctivae normal.      Pupils: Pupils are equal, round, and reactive to light.      Comments: Lids wnl  no photophobia, + red reflex anterior chamber wnl  conj wnl, pain with looking medial and laterally   superior lateral pain   no erythema no swelling ? Dacrocystitis   no temple pain no headache   Vision 20/30 left 20/30 right      Cardiovascular:      Rate and Rhythm: Normal rate and regular rhythm.      Pulses: Normal pulses.      Heart sounds: Normal heart sounds.   Pulmonary:      Effort: Pulmonary effort is normal.      Breath sounds: Normal breath sounds.   Abdominal:      Palpations: Abdomen is soft.     Musculoskeletal:         General: Normal range of motion.      Cervical back: Normal range of motion and neck supple.     Neurological:      Mental Status: He is alert and oriented to person, place, and time.                          [1]   Current Outpatient Medications:     glucosamine-chondroitin 500-400 MG tablet, Take 1 tablet  by mouth in the morning., Disp: , Rfl:     rosuvastatin (CRESTOR) 10 MG tablet, Take 1 tablet (10 mg total) by mouth daily, Disp: 100 tablet, Rfl: 1    meloxicam (Mobic) 15 mg tablet, Daily as needed (Patient not taking: Reported on 5/19/2025), Disp: 90 tablet, Rfl: 1    omeprazole (PriLOSEC) 40 MG capsule, TAKE 1 CAPSULE (40 MG TOTAL) BY MOUTH DAILY. (Patient not taking: Reported on 5/19/2025), Disp: 90 capsule, Rfl: 0

## 2025-06-27 ENCOUNTER — APPOINTMENT (OUTPATIENT)
Dept: LAB | Facility: CLINIC | Age: 60
End: 2025-06-27
Payer: COMMERCIAL

## 2025-06-27 DIAGNOSIS — M17.0 OSTEOARTHRITIS OF BOTH KNEES, UNSPECIFIED OSTEOARTHRITIS TYPE: ICD-10-CM

## 2025-06-27 DIAGNOSIS — E78.5 DYSLIPIDEMIA, GOAL LDL BELOW 130: ICD-10-CM

## 2025-06-27 DIAGNOSIS — Z79.1 NSAID LONG-TERM USE: ICD-10-CM

## 2025-06-27 LAB
ALBUMIN SERPL BCG-MCNC: 4.4 G/DL (ref 3.5–5)
ALP SERPL-CCNC: 79 U/L (ref 34–104)
ALT SERPL W P-5'-P-CCNC: 14 U/L (ref 7–52)
ANION GAP SERPL CALCULATED.3IONS-SCNC: 9 MMOL/L (ref 4–13)
AST SERPL W P-5'-P-CCNC: 18 U/L (ref 13–39)
BASOPHILS # BLD AUTO: 0.05 THOUSANDS/ÂΜL (ref 0–0.1)
BASOPHILS NFR BLD AUTO: 1 % (ref 0–1)
BILIRUB SERPL-MCNC: 0.76 MG/DL (ref 0.2–1)
BUN SERPL-MCNC: 11 MG/DL (ref 5–25)
CALCIUM SERPL-MCNC: 9.1 MG/DL (ref 8.4–10.2)
CHLORIDE SERPL-SCNC: 106 MMOL/L (ref 96–108)
CO2 SERPL-SCNC: 26 MMOL/L (ref 21–32)
CREAT SERPL-MCNC: 0.88 MG/DL (ref 0.6–1.3)
CRP SERPL QL: 8.4 MG/L
EOSINOPHIL # BLD AUTO: 0.66 THOUSAND/ÂΜL (ref 0–0.61)
EOSINOPHIL NFR BLD AUTO: 10 % (ref 0–6)
ERYTHROCYTE [DISTWIDTH] IN BLOOD BY AUTOMATED COUNT: 13.2 % (ref 11.6–15.1)
ERYTHROCYTE [SEDIMENTATION RATE] IN BLOOD: 25 MM/HOUR (ref 0–19)
GFR SERPL CREATININE-BSD FRML MDRD: 95 ML/MIN/1.73SQ M
GLUCOSE P FAST SERPL-MCNC: 80 MG/DL (ref 65–99)
HCT VFR BLD AUTO: 44.5 % (ref 36.5–49.3)
HGB BLD-MCNC: 14.8 G/DL (ref 12–17)
IMM GRANULOCYTES # BLD AUTO: 0.03 THOUSAND/UL (ref 0–0.2)
IMM GRANULOCYTES NFR BLD AUTO: 1 % (ref 0–2)
LDLC SERPL DIRECT ASSAY-MCNC: 83 MG/DL (ref 0–100)
LYMPHOCYTES # BLD AUTO: 2.06 THOUSANDS/ÂΜL (ref 0.6–4.47)
LYMPHOCYTES NFR BLD AUTO: 31 % (ref 14–44)
MCH RBC QN AUTO: 27.7 PG (ref 26.8–34.3)
MCHC RBC AUTO-ENTMCNC: 33.3 G/DL (ref 31.4–37.4)
MCV RBC AUTO: 83 FL (ref 82–98)
MONOCYTES # BLD AUTO: 0.74 THOUSAND/ÂΜL (ref 0.17–1.22)
MONOCYTES NFR BLD AUTO: 11 % (ref 4–12)
NEUTROPHILS # BLD AUTO: 3.06 THOUSANDS/ÂΜL (ref 1.85–7.62)
NEUTS SEG NFR BLD AUTO: 46 % (ref 43–75)
NRBC BLD AUTO-RTO: 0 /100 WBCS
PLATELET # BLD AUTO: 251 THOUSANDS/UL (ref 149–390)
PMV BLD AUTO: 10.4 FL (ref 8.9–12.7)
POTASSIUM SERPL-SCNC: 4.2 MMOL/L (ref 3.5–5.3)
PROT SERPL-MCNC: 7.3 G/DL (ref 6.4–8.4)
RBC # BLD AUTO: 5.34 MILLION/UL (ref 3.88–5.62)
SODIUM SERPL-SCNC: 141 MMOL/L (ref 135–147)
WBC # BLD AUTO: 6.6 THOUSAND/UL (ref 4.31–10.16)

## 2025-06-27 PROCEDURE — 85652 RBC SED RATE AUTOMATED: CPT

## 2025-06-27 PROCEDURE — 86140 C-REACTIVE PROTEIN: CPT

## 2025-06-27 PROCEDURE — 85025 COMPLETE CBC W/AUTO DIFF WBC: CPT

## 2025-06-27 PROCEDURE — 80053 COMPREHEN METABOLIC PANEL: CPT

## 2025-06-27 PROCEDURE — 83721 ASSAY OF BLOOD LIPOPROTEIN: CPT

## 2025-06-27 PROCEDURE — 36415 COLL VENOUS BLD VENIPUNCTURE: CPT

## 2025-06-30 ENCOUNTER — OFFICE VISIT (OUTPATIENT)
Dept: FAMILY MEDICINE CLINIC | Facility: CLINIC | Age: 60
End: 2025-06-30
Payer: COMMERCIAL

## 2025-06-30 VITALS
DIASTOLIC BLOOD PRESSURE: 70 MMHG | WEIGHT: 201 LBS | OXYGEN SATURATION: 98 % | TEMPERATURE: 98 F | BODY MASS INDEX: 35.61 KG/M2 | SYSTOLIC BLOOD PRESSURE: 130 MMHG | HEIGHT: 63 IN | RESPIRATION RATE: 16 BRPM | HEART RATE: 68 BPM

## 2025-06-30 DIAGNOSIS — Z00.00 ANNUAL PHYSICAL EXAM: Primary | ICD-10-CM

## 2025-06-30 DIAGNOSIS — Z23 ENCOUNTER FOR IMMUNIZATION: ICD-10-CM

## 2025-06-30 PROBLEM — D50.9 IRON DEFICIENCY ANEMIA: Status: RESOLVED | Noted: 2024-01-29 | Resolved: 2025-06-30

## 2025-06-30 PROBLEM — L94.0: Status: RESOLVED | Noted: 2018-04-11 | Resolved: 2025-06-30

## 2025-06-30 PROBLEM — E78.2 MIXED HYPERLIPIDEMIA: Status: ACTIVE | Noted: 2019-04-26

## 2025-06-30 PROBLEM — E78.5 DYSLIPIDEMIA, GOAL LDL BELOW 130: Status: RESOLVED | Noted: 2019-04-26 | Resolved: 2025-06-30

## 2025-06-30 PROCEDURE — 90472 IMMUNIZATION ADMIN EACH ADD: CPT

## 2025-06-30 PROCEDURE — 90715 TDAP VACCINE 7 YRS/> IM: CPT

## 2025-06-30 PROCEDURE — 90677 PCV20 VACCINE IM: CPT

## 2025-06-30 PROCEDURE — 90471 IMMUNIZATION ADMIN: CPT

## 2025-06-30 PROCEDURE — 99396 PREV VISIT EST AGE 40-64: CPT | Performed by: FAMILY MEDICINE

## 2025-06-30 RX ORDER — IBUPROFEN 200 MG
400 TABLET ORAL 3 TIMES DAILY
COMMUNITY
End: 2025-07-10

## 2025-06-30 NOTE — PROGRESS NOTES
Adult Annual Physical  Name: Faustino Gallagher      : 1965      MRN: [id number]  Encounter Provider: Karina Rneo DO  Encounter Date: 2025   Encounter department: [facility]    :  Assessment & Plan  Annual physical exam  Patient had a normal exam today in the office.  He is encouraged to work on improving his diet and try to lose weight.  His lab work looked very good.  His cholesterol has improved significantly with the rosuvastatin.  We maintain him on his current regimen.  I encouraged him to follow-up with his rheumatologist to discuss the fact that the Tylenol is not helping with the arthritis in his knees.  He will report back if he needs further assistance.  Have warned him about the risk of chronic NSAID use and that would include over-the-counter Motrin.  He will reach out to his rheumatologist to discuss other options.       Encounter for immunization  The patient was administered the vaccines in the office as ordered.  In addition, I did recommend the Shingrix shingles vaccine to him which she will schedule at another time.  We will see him back as scheduled.  Orders:  •  TDAP VACCINE GREATER THAN OR EQUAL TO 6YO IM  •  Pneumococcal Conjugate Vaccine 20-valent (Pcv20)        Preventive Screenings:  - Diabetes Screening: risks/benefits discussed and screening up-to-date  - Cholesterol Screening: screening not indicated, has hyperlipidemia, risks/benefits discussed and screening up-to-date   - Hepatitis C screening: screening up-to-date   - HIV screening: screening not indicated   - Colon cancer screening: screening up-to-date   - Lung cancer screening: screening not indicated   - Prostate cancer screening: risks/benefits discussed and screening up-to-date     Immunizations:  - Immunizations due: Zoster (Shingrix)  - Risks/benefits immunizations discussed      Counseling/Anticipatory Guidance:    - Dental health: discussed importance of regular tooth  brushing, flossing, and dental visits.   - Diet: discussed recommendations for a healthy/well-balanced diet.   - Exercise: the importance of regular exercise/physical activity was discussed. Recommend exercise 3-5 times per week for at least 30 minutes.   - Injury prevention: discussed safety/seat belts, safety helmets, smoke detectors, carbon monoxide detectors, and smoking near bedding or upholstery.       Depression Screening and Follow-up Plan: Patient was screened for depression during today's encounter. They screened negative with a PHQ-2 score of 0.        Chief Complaint   Patient presents with   • Annual Exam       History of Present Illness     Adult Annual Physical:  Patient presents for annual physical. Faustino Gallagher is a 59 y.o. male who presents today for routine physical.  He is feeling well overall.  Has ongoing pain in his knees related to osteoarthritis for which she is following up with rheumatology.    GI stated he was going to repeat the EGD after 5 years- He is taking Ibuprofen 200 mg TID  The patient denies any chest pain, shortness of breath, or palpitations.  There is no CASTRO, PND, or orthopnea.  There is no edema.  There are no headaches or visual changes.  There is no lightheadedness, dizziness, or fainting spells.  The patient currently denies any nausea, vomiting, or GERD symptoms.  he has normal bowel movements and normal urine output.  he has a normal appetite.  He stopped the omeprazole- he is using it as needed.  He has pain in both knees- seeing Rheumatology.    He tracks his BP with his phone.  .     Diet and Physical Activity:  - Diet/Nutrition: no special diet, well balanced diet, limited junk food, adequate fiber intake and adequate whole grain intake.  - Exercise: no formal exercise, walking and 5-7 times a week on average. He is limited  due to his knees.    Depression Screening:  - PHQ-2 Score: 0    General Health:  - Sleep: sleeps well and 7-8 hours of sleep on  "average.  - Hearing: normal hearing bilateral ears.  - Vision: most recent eye exam < 1 year ago and wears glasses.  - Dental: regular dental visits and brushes teeth once daily.    /GYN Health:  - Follows with GYN: no.   - History of STDs: no     Health:  - History of STDs: no.   - Urinary symptoms: none.     Advanced Care Planning:  - Has an advanced directive?: no    - Has a durable medical POA?: no    - ACP document given to patient?: no      Review of Systems   Constitutional: Negative.    HENT: Negative.     Eyes: Negative.    Respiratory: Negative.     Cardiovascular: Negative.    Gastrointestinal: Negative.    Endocrine: Negative.    Genitourinary: Negative.    Musculoskeletal: Negative.    Skin: Negative.    Allergic/Immunologic: Negative.    Neurological: Negative.    Hematological: Negative.    Psychiatric/Behavioral: Negative.       Medical History Reviewed by provider this encounter:  Tobacco  Soc Hx    .  Medications Ordered Prior to Encounter[1]   Social History[2]    Objective   /70   Pulse 68   Temp 98 °F (36.7 °C) (Tympanic)   Resp 16   Ht 5' 3\" (1.6 m)   Wt 91.2 kg (201 lb)   SpO2 98%   BMI 35.61 kg/m²     Physical Exam  Vitals and nursing note reviewed.   Constitutional:       General: He is not in acute distress.     Appearance: Normal appearance. He is well-developed. He is not diaphoretic.   HENT:      Head: Normocephalic and atraumatic.      Right Ear: Tympanic membrane, ear canal and external ear normal.      Left Ear: Tympanic membrane, ear canal and external ear normal.      Nose: No congestion or rhinorrhea.      Mouth/Throat:      Mouth: Mucous membranes are moist.      Pharynx: Oropharynx is clear.     Eyes:      Extraocular Movements: Extraocular movements intact.      Pupils: Pupils are equal, round, and reactive to light.     Neck:      Thyroid: No thyromegaly.      Vascular: No JVD.      Trachea: No tracheal deviation.     Cardiovascular:      Rate and Rhythm: " Normal rate and regular rhythm.      Heart sounds: Normal heart sounds. No murmur heard.     No friction rub. No gallop.   Pulmonary:      Effort: Pulmonary effort is normal. No respiratory distress.      Breath sounds: Normal breath sounds. No stridor. No wheezing or rales.   Chest:      Chest wall: No tenderness.   Abdominal:      General: Bowel sounds are normal. There is no distension.      Palpations: Abdomen is soft. There is no mass.      Tenderness: There is no abdominal tenderness. There is no guarding or rebound.     Musculoskeletal:         General: Normal range of motion.      Cervical back: Normal range of motion and neck supple.   Lymphadenopathy:      Cervical: No cervical adenopathy.     Skin:     General: Skin is warm and dry.      Coloration: Skin is not pale.      Findings: No erythema or rash.     Neurological:      Mental Status: He is alert and oriented to person, place, and time.      Cranial Nerves: No cranial nerve deficit.      Motor: No abnormal muscle tone.      Coordination: Coordination normal.      Deep Tendon Reflexes: Reflexes are normal and symmetric. Reflexes normal.       Administrative Statements   I have spent a total time of 40 minutes in caring for this patient on the day of the visit/encounter including Prognosis, Risks and benefits of tx options, Instructions for management, Patient and family education, Importance of tx compliance, Risk factor reductions, Impressions, Counseling / Coordination of care, Documenting in the medical record, Reviewing/placing orders in the medical record (including tests, medications, and/or procedures), and Obtaining or reviewing history  .         [1]  Current Outpatient Medications on File Prior to Visit   Medication Sig Dispense Refill   • glucosamine-chondroitin 500-400 MG tablet Take 1 tablet by mouth in the morning.     • ibuprofen (MOTRIN) 200 mg tablet Take 400 mg by mouth 3 (three) times a day     • rosuvastatin (CRESTOR) 10 MG tablet  Take 1 tablet (10 mg total) by mouth daily 100 tablet 1     No current facility-administered medications on file prior to visit.   [2]  Social History  Tobacco Use   • Smoking status: Never     Passive exposure: Never   • Smokeless tobacco: Never   Vaping Use   • Vaping status: Never Used   Substance and Sexual Activity   • Alcohol use: Never   • Drug use: Never   • Sexual activity: Yes     Partners: Female     Birth control/protection: None

## 2025-06-30 NOTE — PATIENT INSTRUCTIONS
Validated Blood Pressure Machines (Prices as of 2025):    Model  Schmid Where to Buy    Omron 5 series [id number] - Wireless  $77.50  Internet   Omron 7 series [id number]- Wireless  $99.99  Internet   Omron 10 series [id number] -Wireless  $105  Internet   * Omron 10 Recommended        Omron Bronze 5100  $40.21  Internet   Omron Silver BP 5250- Wireless  $59.99  Internet   Omron Gold 5350  $76.74  Internet   Omron Platinum 5450  $89.99  Internet   A&D Ultraconnect Wireless and Hoseless  $99.99  Internet           Omron 3 series [id number]  $59.99  Drug Store           Talking device:       A&D UA 1030T $88 $89.99  Internet           Option of XL:       Malloy [place] Home BP Monitor 1700 series (3 cuff sizes) $119.99 stnd +$22 for XL Internet   Malloy Rubia [id number] $172 $195.95  Internet           Wrist cuff:       Omron [id number] $89.99  Internet   Blood Pressure Log    Name:_______________________________ : ______________________ Goal BP: ______________________    Sitting   Date AM PM Pulse Notes (Med changes/Feeling Unwell)                                                                                                                                                 Please monitor and record your blood pressure readings for seven days in a row and bring to your appointment  Take one reading, both sitting and standing in the morning, 30 minutes to one hour before medications  Take one reading, both sitting and standing, between 5-10 PM before medications   When you take your blood pressure sit comfortably and quietly for 3-5 minutes, at a table with your arm resting at heart level, both feet on the floor and back supported and with an empty bladder   Don't cross your legs, drink caffeinated beverages or smoke within 30 minutes of taking your blood pressure  Make sure the cuff is properly positioned on your arm  Make sure the cuff is the appropriate size  Have your blood pressure monitor checked at the doctor's office at least  once a year        Name:_____________________________ : ______________________ Goal BP: ______________________    Standing  Date AM PM Pulse Notes (Med changes/Feeling Unwell)                                                                                                                                                  Please monitor and record your blood pressure readings for seven days in a row and bring to your appointment  Take one reading, both sitting and standing in the morning, 30 minutes to one hour before medications  Take one reading, both sitting and standing, between 5-10 PM before medications   When you take your blood pressure sit comfortably and quietly for 3-5 minutes, at a table with your arm resting at heart level, both feet on the floor and back supported and with an empty bladder   Don't cross your legs, drink caffeinated beverages or smoke within 30 minutes of taking your blood pressure  Make sure the cuff is properly positioned on your arm  Make sure the cuff is the appropriate size  Have your blood pressure monitor checked at the doctor's office at least once a year

## 2025-07-08 DIAGNOSIS — E78.5 DYSLIPIDEMIA, GOAL LDL BELOW 130: ICD-10-CM

## 2025-07-09 RX ORDER — ROSUVASTATIN CALCIUM 10 MG/1
10 TABLET, COATED ORAL DAILY
Qty: 90 TABLET | Refills: 1 | Status: SHIPPED | OUTPATIENT
Start: 2025-07-09

## 2025-07-10 ENCOUNTER — OFFICE VISIT (OUTPATIENT)
Dept: RHEUMATOLOGY | Facility: CLINIC | Age: 60
End: 2025-07-10
Payer: COMMERCIAL

## 2025-07-10 VITALS
HEIGHT: 63 IN | DIASTOLIC BLOOD PRESSURE: 78 MMHG | TEMPERATURE: 97.7 F | BODY MASS INDEX: 34.8 KG/M2 | OXYGEN SATURATION: 96 % | WEIGHT: 196.4 LBS | HEART RATE: 50 BPM | SYSTOLIC BLOOD PRESSURE: 136 MMHG

## 2025-07-10 DIAGNOSIS — Z79.1 NSAID LONG-TERM USE: ICD-10-CM

## 2025-07-10 DIAGNOSIS — K27.9 PUD (PEPTIC ULCER DISEASE): ICD-10-CM

## 2025-07-10 DIAGNOSIS — M17.0 OSTEOARTHRITIS OF BOTH KNEES, UNSPECIFIED OSTEOARTHRITIS TYPE: Primary | ICD-10-CM

## 2025-07-10 PROCEDURE — 99214 OFFICE O/P EST MOD 30 MIN: CPT | Performed by: INTERNAL MEDICINE

## 2025-07-10 RX ORDER — OMEPRAZOLE 20 MG/1
20 CAPSULE, DELAYED RELEASE ORAL DAILY
Qty: 90 CAPSULE | Refills: 1 | Status: SHIPPED | OUTPATIENT
Start: 2025-07-10

## 2025-07-10 RX ORDER — MELOXICAM 7.5 MG/1
7.5 TABLET ORAL 2 TIMES DAILY PRN
Qty: 180 TABLET | Refills: 1 | Status: SHIPPED | OUTPATIENT
Start: 2025-07-10

## 2025-07-10 NOTE — ASSESSMENT & PLAN NOTE
- I reviewed his upper endoscopy which does show duodenal ulcerations which is likely NSAID induced.  I advised him to minimize meloxicam use as much as possible and continue the omeprazole daily while on the meloxicam.  I also encouraged him to schedule a follow-up with gastroenterology next year to determine if he needs an upper endoscopy repeated at any time to assess for healing of the ulcerations.     Orders:    CBC and differential    Comprehensive metabolic panel

## 2025-07-10 NOTE — ASSESSMENT & PLAN NOTE
Mr. Gallagher is a 59-year-old male with history significant for bilateral knee osteoarthritis (presumed to be primary in nature) who presents for a follow-up.  He is currently on meloxicam 15 mg once daily.    - Fredi presents today for a follow-up of bilateral knee pain which has been ongoing since at least 2007, which I suspect is secondary to primary osteoarthritis given excellent response to NSAIDs without progression of symptoms over the years, not requiring DMARD use, lack of striking features suggestive of an inflammatory arthritis on an MRI of his right in 10/21 knee and unremarkable serological workup.    - He has responded very well to use of meloxicam 15 mg once daily which he is reliant on and may be acceptable to continue for now, but we discussed the implications of long-term NSAID use.  He has already been diagnosed with peptic ulcer disease and was advised by gastroenterology to continue daily omeprazole for as long as he is on NSAIDs.  Since he remains asymptomatic on the meloxicam I advised him to try a lower dose of 7.5 mg once daily.  Alternate options could include neuromodulating medications, such as duloxetine but he is not inclined to proceed with this for now.  He will update high risk medication lab monitoring to assess for drug toxicities prior to the follow-up visit.    Orders:    meloxicam (MOBIC) 7.5 mg tablet; Take 1 tablet (7.5 mg total) by mouth 2 (two) times a day as needed for moderate pain

## 2025-07-10 NOTE — PROGRESS NOTES
Name: Faustino Gallagher      : 1965      MRN: [id number]  Encounter Provider: Maria Fernanda Preciado MD  Encounter Date: 7/10/2025   Encounter department: [facility] [place]  :  Assessment & Plan  Osteoarthritis of both knees, unspecified osteoarthritis type  Mr. Gallagher is a 59-year-old male with history significant for bilateral knee osteoarthritis (presumed to be primary in nature) who presents for a follow-up.  He is currently on meloxicam 15 mg once daily.    - Fredi presents today for a follow-up of bilateral knee pain which has been ongoing since at least , which I suspect is secondary to primary osteoarthritis given excellent response to NSAIDs without progression of symptoms over the years, not requiring DMARD use, lack of striking features suggestive of an inflammatory arthritis on an MRI of his right in 10/21 knee and unremarkable serological workup.    - He has responded very well to use of meloxicam 15 mg once daily which he is reliant on and may be acceptable to continue for now, but we discussed the implications of long-term NSAID use.  He has already been diagnosed with peptic ulcer disease and was advised by gastroenterology to continue daily omeprazole for as long as he is on NSAIDs.  Since he remains asymptomatic on the meloxicam I advised him to try a lower dose of 7.5 mg once daily.  Alternate options could include neuromodulating medications, such as duloxetine but he is not inclined to proceed with this for now.  He will update high risk medication lab monitoring to assess for drug toxicities prior to the follow-up visit.    Orders:    meloxicam (MOBIC) 7.5 mg tablet; Take 1 tablet (7.5 mg total) by mouth 2 (two) times a day as needed for moderate pain    NSAID long-term use  - I reviewed his upper endoscopy which does show duodenal ulcerations which is likely NSAID induced.  I advised him to minimize meloxicam use as much as possible and continue the  omeprazole daily while on the meloxicam.  I also encouraged him to schedule a follow-up with gastroenterology next year to determine if he needs an upper endoscopy repeated at any time to assess for healing of the ulcerations.     Orders:    CBC and differential    Comprehensive metabolic panel    PUD (peptic ulcer disease)    Orders:    omeprazole (PriLOSEC) 20 mg delayed release capsule; Take 1 capsule (20 mg total) by mouth daily      Patient's rheumatologic disease(s) threaten long-term function if not appropriately managed.      History of Present Illness   HPI    INITIAL VISIT NOTE (7/2021):  Mr. Gallagher is a 55-year-old male with history significant for bilateral knee osteoarthritis (presumed to be primary in nature), who presents for further evaluation of this.  He is referred by Dr. Jung for a rheumatology consult.       Patient reports over the past 15 years he has experienced recurrent pain and swelling affecting his bilateral knees, primarily on the right side.  He was seen by Rheumatology in [place] for these symptoms a few years ago and had a thorough rheumatological workup done which was unrevealing.  His presentation was thought to be consistent with primary osteoarthritis.  He has received intra-articular cortisone injections 1-2 times and this has helped him significantly.  No recent injections.  He has also had multiple arthrocentesis performed which apparently have been unrevealing.  His most recent one was done in April 2019 and showed a synovial fluid WBC count of 2464 with a negative synovial fluid crystal and Lyme PCR analysis.  There was a note mentioning extensive inflammation and reactive changes were present.  He has not had knee x-rays done in a few years but apparently this has showed tricompartmental narrowing with joint effusions.  He has not had an MRI of his knees.     He reports over the past 15 years his symptoms have progressively worsened where he experiences some degree of  pain and swelling on a daily and constant basis, but states that the swelling can flare-up.  He reports over the past 15 years he has also had intermittent pain that will affect his bilateral hands (in the PIP and MCP joints), wrists, ankles and feet.  These episodes are usually short-lived and infrequent.  He denies any joint pains in his elbows, shoulders, low back or hips.  Other than the knee swelling he denies other joint swelling.  He experiences morning stiffness which affects his knees and can take 2-3 hours to improve.  He has tried naproxen in the past which has provided him with mild relief which is short-lived.  He has been on diclofenac 75 mg twice a day as needed and states that this does not really help.  He has not been prescribed repeat courses of steroids in the past.  He is currently on a methylprednisolone Dosepak that was started on July 10th after he was seen in the urgent care for a skin rash that he has been experiencing on his knees for the past 5 days.  He reports with his current steroid use this has significantly helped with the bilateral knee pain and swelling and he feels like the skin rash is also clearing up.  He reports that the skin rash has occurred for the first time and is not a recurrent issue.     He mentions over the past 2-3 months he has been experiencing fevers up to 102° F which will last 24 hours and occurs every 7-10 days.  He has not seen his primary care physician for this.  He denies night sweats, unintentional weight loss, inflammatory eye disease (he does experience chronic dry eyes and occasional dry mouth), other types of skin rash, psoriasis, mouth/nose ulcers (can occasionally experience a canker sore related to increased stress), swollen glands, pleuritic chest pain, inflammatory bowel disease, blood clots or a family history of autoimmune disease.  He denies any other medical conditions or symptoms and reports that he is otherwise healthy.         9/3/2021:  Patient presents for a follow up visit today.  The labs showed a positive ZULEMA 1:160 dense fine speckled pattern.  A C reactive protein was slightly elevated at 15.  A ferritin, CK, ESR, uric acid, HLA B27 antigen, anti CCP antibody, rheumatoid factor, Sjogren's antibodies and hepatitis panel were unremarkable.  X-rays of the bilateral knees showed osteoarthritis with small joint effusions.     After the last office visit we discussed discontinuing the diclofenac as it was not helping with his symptoms.  I replaced this with meloxicam 15 mg once a day as needed and he states that this has been helping him with his overall joint pains, but he still experiences pain in his knees as well as at the heels of his bilateral feet, which is more prominent first thing in the morning with the first few steps and then gradually improves.  No other significant joint pains at this time.  He still notices swelling of his knees, more prominently on the right side.  He states that taking meloxicam in combination with the multivitamin seems to be helping him as well.  Since starting the meloxicam he has not had any fevers.  No other complaints today.        3/4/2022:  Patient presents for a follow-up of bilateral knee pain.  He is currently on meloxicam 15 mg once daily.  He did not have a chance to do the ZULEMA related blood work after the last office visit.  I did review the MRI of his right knee which showed:     Longitudinal tear of the medial meniscus.     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  Grade 3 patellofemoral chondrosis.     Moderate joint effusion with lipoma arborescens.  This is nonspecific, but not necessarily indicative of inflammatory arthritis.  In this patient, it is more likely related to osteoarthritis.     No bony erosive changes are seen.  There is no tenosynovitis of the knee.     He reports overall with the meloxicam he has been doing very well and denies any knee pain,  swelling or stiffness.  No additional joint complaints.  He is also taking Osteo Bi-Flex supplements which he thinks helps.        3/3/2023:  Patient presents for a follow-up of bilateral knee osteoarthritis.  He is currently on meloxicam 15 mg once a day as needed.  I reviewed his labs done after the last office visit which showed an unremarkable CBC, CMP, ZULEMA specificity, antiphospholipid antibody testing, C3, C4, urine analysis and urine protein creatinine ratio.     He reports he has overall done well over the past year with taking meloxicam 15 mg once a day as needed as well as Osteo Bi-Flex.  No concerning joint pains, swelling or stiffness.  He reports if he tries to miss the meloxicam for a few days he will notice a change in his symptoms.     His only other complaint is experiencing abdominal pain which improves after he eats.  He has also been taking Tums as needed which helps.  No vomiting, blood in stools or dark stools.        3/7/2024:  Patient presents for a follow-up of bilateral knee osteoarthritis.  He is currently on meloxicam 15 mg once every other day.  I reviewed his recent labs which shows a normal ESR, CRP and CMP.  A CBC showed anemia with a hemoglobin of 11.6 and ferritin of 10.  He will be undergoing bidirectional scopes in June and through his primary care physician was started on daily iron supplements.     He reports over the past year he has been fairly stable and has not had any worsening in his joint pains, swelling or stiffness.  The bilateral knee pain is still present but manageable with meloxicam 15 mg once every other day.  At baseline the knee pain is 3-4/10 in intensity but this improves after taking the meloxicam.  The Osteo Bi-Flex has also helped him.     He was seen by gastroenterology in view of the abdominal pain and acid reflux.  As mentioned the plan is to proceed with bidirectional scopes in June.  He will take short courses of the omeprazole as needed which helps  with the symptoms.  Currently he does not have any of these complaints.        3/4/2025:  Patient presents for a follow-up of bilateral knee osteoarthritis.  He is currently on meloxicam 15 mg once daily but does try to skip a day if he feels well.  I reviewed his CBC, CMP, ESR and CRP from 12/24 which were unremarkable.  In 12/24 he had an upper endoscopy done which showed multiple small, superficial ulcers in the duodenum.  Gastroenterology said this is likely NSAID induced and started him on omeprazole 40 mg once daily and advised him to continue it for as long as he is taking the meloxicam.     He reports with taking the meloxicam he has not been experiencing any concerning issues.  The knee pain is very stable.  He did see hand orthopedics for left hand ring finger trigger finger and received a steroid injection which helped.  Otherwise no new joint pains.  In the mornings he does notice some puffiness of his hands but with exercising his hands this quickly resolves.  He experiences widespread morning stiffness that takes about 5 minutes to improve and reports with morning exercises/stretches this resolves quickly.  He has also been taking Osteo Bi-Flex which does help with his joint pains.      7/10/2025:  Patient presents for a follow-up of bilateral knee osteoarthritis.  After he was diagnosed with peptic ulcer disease he discontinued the meloxicam and tried taking Tylenol for a few weeks to months which was ineffective so he stopped it.  He then started taking Advil 400 mg 3 times a day as needed which was not as effective.  In view of this he restarted meloxicam 15 mg once daily and states when he is on this it helps him significantly and provides him with 24 hours of relief.  He cannot skip a day and he has not tried reducing the dose.  With the meloxicam he has been taking daily omeprazole 40 mg.    Other than the knees he denies any joint pains.  There are no swollen or stiff joints.  He has also been  "taking Osteo Bi-Flex which does help with his joint pains.          Review of Systems  Constitutional: Negative for weight change, fevers, chills, night sweats, fatigue.  ENT/Mouth: Negative for hearing changes, ear pain, nasal congestion, sinus pain, hoarseness, sore throat, rhinorrhea, swallowing difficulty.   Eyes: Negative for pain, redness, discharge, vision changes.   Cardiovascular: Negative for chest pain, SOB, palpitations.   Respiratory: Negative for cough, sputum, wheezing, dyspnea.   Gastrointestinal: Negative for nausea, vomiting, diarrhea, constipation, pain, heartburn.  Genitourinary: Negative for dysuria, urinary frequency, hematuria.   Musculoskeletal: As per HPI.  Skin: Negative for skin rash, color changes.   Neuro: Negative for weakness, numbness, tingling, loss of consciousness.   Psych: Negative for anxiety, depression.   Heme/Lymph: Negative for easy bruising, bleeding, lymphadenopathy.      Past Medical History   Past Medical History[1]  Past Surgical History[2]  Family History[3]   reports that he has never smoked. He has never been exposed to tobacco smoke. He has never used smokeless tobacco. He reports that he does not drink alcohol and does not use drugs.  Current Outpatient Medications   Medication Instructions    glucosamine-chondroitin 500-400 MG tablet 1 tablet, Daily    meloxicam (MOBIC) 7.5 mg, Oral, 2 times daily PRN    omeprazole (PRILOSEC) 20 mg, Oral, Daily    rosuvastatin (CRESTOR) 10 mg, Oral, Daily   Allergies[4]   Medications Ordered Prior to Encounter[5]   Social History[6]     Objective   /78 (BP Location: Left arm, Patient Position: Sitting, Cuff Size: Large)   Pulse (!) 50   Temp 97.7 °F (36.5 °C) (Tympanic)   Ht 5' 3\" (1.6 m)   Wt 89.1 kg (196 lb 6.4 oz)   SpO2 96%   BMI 34.79 kg/m²      Physical Exam  General: Well appearing, well nourished, in no distress. Oriented x 3, normal mood and affect.  Ambulating without difficulty.  Skin: Good turgor, no rash, " unusual bruising or prominent lesions.  HEENT:  Head: Normocephalic, atraumatic.  Eyes: Conjunctiva clear, sclera non-icteric, EOM intact.  Nose: No external lesions.  Neck: Supple.  Extremities: No amputations or deformities, cyanosis, edema.  Musculoskeletal:   Knees - chronic knee puffiness noted bilaterally that appears symmetric, no tenderness, warmth or erythema.  No joint effusion appreciated.  Neurologic: Alert and oriented. No focal neurological deficits appreciated.   Psychiatric: Normal mood and affect.            [1]   Past Medical History:  Diagnosis Date    Osteoarthritis 04/11/2018   [2]   Past Surgical History:  Procedure Laterality Date    APPENDECTOMY  1991    COLONOSCOPY  2019    [facility]    DENTAL SURGERY      Tooth extraction   [3]   Family History  Problem Relation Name Age of Onset    No Known Problems Mother      No Known Problems Father      No Known Problems Sister      No Known Problems Brother     [4] No Known Allergies  [5]   Current Outpatient Medications on File Prior to Visit   Medication Sig Dispense Refill    glucosamine-chondroitin 500-400 MG tablet Take 1 tablet by mouth in the morning.      rosuvastatin (CRESTOR) 10 MG tablet TAKE 1 TABLET BY MOUTH EVERY DAY 90 tablet 1    [DISCONTINUED] ibuprofen (MOTRIN) 200 mg tablet Take 400 mg by mouth 3 (three) times a day       No current facility-administered medications on file prior to visit.   [6]   Social History  Tobacco Use    Smoking status: Never     Passive exposure: Never    Smokeless tobacco: Never   Vaping Use    Vaping status: Never Used   Substance and Sexual Activity    Alcohol use: Never    Drug use: Never    Sexual activity: Yes     Partners: Female     Birth control/protection: None